# Patient Record
Sex: FEMALE | Race: WHITE | NOT HISPANIC OR LATINO | Employment: UNEMPLOYED | URBAN - METROPOLITAN AREA
[De-identification: names, ages, dates, MRNs, and addresses within clinical notes are randomized per-mention and may not be internally consistent; named-entity substitution may affect disease eponyms.]

---

## 2021-07-14 ENCOUNTER — OFFICE VISIT (OUTPATIENT)
Dept: FAMILY MEDICINE CLINIC | Facility: CLINIC | Age: 66
End: 2021-07-14

## 2021-07-14 VITALS
OXYGEN SATURATION: 98 % | SYSTOLIC BLOOD PRESSURE: 138 MMHG | WEIGHT: 189 LBS | DIASTOLIC BLOOD PRESSURE: 80 MMHG | HEART RATE: 57 BPM | HEIGHT: 63 IN | BODY MASS INDEX: 33.49 KG/M2

## 2021-07-14 DIAGNOSIS — E53.8 VITAMIN B12 DEFICIENCY: ICD-10-CM

## 2021-07-14 DIAGNOSIS — Z00.00 PREVENTATIVE HEALTH CARE: ICD-10-CM

## 2021-07-14 DIAGNOSIS — E78.2 MIXED HYPERLIPIDEMIA: Primary | ICD-10-CM

## 2021-07-14 DIAGNOSIS — Z12.31 ENCOUNTER FOR SCREENING MAMMOGRAM FOR BREAST CANCER: ICD-10-CM

## 2021-07-14 DIAGNOSIS — E03.9 ACQUIRED HYPOTHYROIDISM: ICD-10-CM

## 2021-07-14 DIAGNOSIS — E55.9 VITAMIN D DEFICIENCY: ICD-10-CM

## 2021-07-14 PROCEDURE — 82607 VITAMIN B-12: CPT | Performed by: NURSE PRACTITIONER

## 2021-07-14 PROCEDURE — 36415 COLL VENOUS BLD VENIPUNCTURE: CPT | Performed by: NURSE PRACTITIONER

## 2021-07-14 PROCEDURE — 80053 COMPREHEN METABOLIC PANEL: CPT | Performed by: NURSE PRACTITIONER

## 2021-07-14 PROCEDURE — 86803 HEPATITIS C AB TEST: CPT | Performed by: NURSE PRACTITIONER

## 2021-07-14 PROCEDURE — 99204 OFFICE O/P NEW MOD 45 MIN: CPT | Performed by: NURSE PRACTITIONER

## 2021-07-14 PROCEDURE — 82306 VITAMIN D 25 HYDROXY: CPT | Performed by: NURSE PRACTITIONER

## 2021-07-14 PROCEDURE — 85027 COMPLETE CBC AUTOMATED: CPT | Performed by: NURSE PRACTITIONER

## 2021-07-14 PROCEDURE — 80061 LIPID PANEL: CPT | Performed by: NURSE PRACTITIONER

## 2021-07-14 PROCEDURE — 84443 ASSAY THYROID STIM HORMONE: CPT | Performed by: NURSE PRACTITIONER

## 2021-07-14 RX ORDER — LEVOTHYROXINE SODIUM 0.07 MG/1
75 TABLET ORAL DAILY
COMMUNITY
End: 2021-07-14 | Stop reason: SDUPTHER

## 2021-07-14 RX ORDER — LEVOTHYROXINE SODIUM 0.07 MG/1
75 TABLET ORAL DAILY
Qty: 30 TABLET | Refills: 2 | Status: SHIPPED | OUTPATIENT
Start: 2021-07-14 | End: 2021-10-20

## 2021-07-14 RX ORDER — DIPHENOXYLATE HYDROCHLORIDE AND ATROPINE SULFATE 2.5; .025 MG/1; MG/1
TABLET ORAL DAILY
COMMUNITY

## 2021-07-14 RX ORDER — SIMVASTATIN 10 MG
10 TABLET ORAL NIGHTLY
Qty: 30 TABLET | Refills: 2 | Status: SHIPPED | OUTPATIENT
Start: 2021-07-14 | End: 2021-10-20

## 2021-07-14 RX ORDER — SIMVASTATIN 10 MG
10 TABLET ORAL NIGHTLY
COMMUNITY
End: 2021-07-14 | Stop reason: SDUPTHER

## 2021-07-14 NOTE — PROGRESS NOTES
Chief Complaint  Establish Care (new to the area and it has been a while since she has seen a doctor. Needs refills on some medications. )    Subjective          Venita Mcdowell presents to [facility]  History of Present Illness    Patient here to establish care.    Patient has history of fatigue and takes Vitamin B12 daily. She also takes Vitamin D due to deficiency.    Patient has history of hypothyroidism. She takes Synthroid 75mcg daily. Patient has not had TSH checked in at least six months.     Patient also has hyperlipidemia. She is taking Zocor as prescribed with no complications. Patient reports previous PCP was going to discontinue Zocor previously due to improving levels.     Patient is due for Mammogram. She had Colonscopy in 2019, due to repeat in 5 years.     Patient has chronic constipation, taking Colace routinely. She reports medication keeps her regular. Patient denies blood/mucus in stool.     Patient denies headache, dizziness, syncope, chest pain.  She has occasional mild swelling to bilateral ankles.  Patient denies cough, dyspnea.  She has no acute bowel or bladder complaints.    PHQ-9 Depression Screening  Little interest or pleasure in doing things? 0   Feeling down, depressed, or hopeless? 0   Trouble falling or staying asleep, or sleeping too much? 0   Feeling tired or having little energy? 0   Poor appetite or overeating? 0   Feeling bad about yourself - or that you are a failure or have let yourself or your family down? 0   Trouble concentrating on things, such as reading the newspaper or watching television? 0   Moving or speaking so slowly that other people could have noticed? Or the opposite - being so fidgety or restless that you have been moving around a lot more than usual? 0   Thoughts that you would be better off dead, or of hurting yourself in some way? 0   PHQ-9 Total Score 0   If you checked off any problems, how difficult have these problems made it  "for you to do your work, take care of things at home, or get along with other people? Not difficult at all         Objective   Vital Signs:   /80 (BP Location: Left arm, Patient Position: Sitting, Cuff Size: Adult)   Pulse 57   Ht 158.8 cm (62.5\")   Wt 85.7 kg (189 lb)   SpO2 98%   BMI 34.02 kg/m²       Physical Exam  Constitutional:       Appearance: Normal appearance.   HENT:      Head: Normocephalic.   Cardiovascular:      Rate and Rhythm: Normal rate and regular rhythm.   Pulmonary:      Effort: Pulmonary effort is normal.      Breath sounds: Normal breath sounds.   Abdominal:      General: Abdomen is flat. Bowel sounds are normal.      Palpations: Abdomen is soft.   Musculoskeletal:         General: Normal range of motion.      Cervical back: Neck supple.      Right lower leg: No edema.      Left lower leg: No edema.   Skin:     General: Skin is warm and dry.   Neurological:      Mental Status: She is alert and oriented to person, place, and time.      Gait: Gait is intact.   Psychiatric:         Attention and Perception: Attention normal.         Mood and Affect: Mood normal.         Speech: Speech normal.        Result Review :                 Assessment and Plan    Diagnoses and all orders for this visit:    1. Mixed hyperlipidemia (Primary)  Assessment & Plan:  1.  Continue Zocor as prescribed  2.  Check a lipid panel today      Orders:  -     CBC (No Diff)  -     Comprehensive Metabolic Panel  -     Lipid Panel  -     TSH    2. Encounter for screening mammogram for breast cancer  Assessment & Plan:  1.  Order mammogram    Orders:  -     Mammo Screening Digital Tomosynthesis Bilateral With CAD; Future    3. Preventative health care  Assessment & Plan:  1.  Colonoscopy up-to-date, will request records  2.  Update mammogram      Orders:  -     Hepatitis C Antibody    4. Vitamin B12 deficiency  Assessment & Plan:  1.  Continue vitamin B12  2.  Check vitamin B12 levels    Orders:  -     Vitamin " B12    5. Vitamin D deficiency  Assessment & Plan:  1.  Continue vitamin D and check level today    Orders:  -     Vitamin D 25 Hydroxy    6. Acquired hypothyroidism  Assessment & Plan:  1.  Continue Synthroid as prescribed  2.  Check TSH      Other orders  -     levothyroxine (SYNTHROID, LEVOTHROID) 75 MCG tablet; Take 1 tablet by mouth Daily.  Dispense: 30 tablet; Refill: 2  -     simvastatin (ZOCOR) 10 MG tablet; Take 1 tablet by mouth Every Night.  Dispense: 30 tablet; Refill: 2    I spent 30 minutes caring for Venita on this date of service. This time includes time spent by me in the following activities:preparing for the visit, obtaining and/or reviewing a separately obtained history, performing a medically appropriate examination and/or evaluation , counseling and educating the patient/family/caregiver, ordering medications, tests, or procedures, documenting information in the medical record and care coordination  Follow Up   Return in about 6 months (around 1/14/2022) for Hyperlipidemia/Hypothyroidism.  Patient was given instructions and counseling regarding her condition or for health maintenance advice. Please see specific information pulled into the AVS if appropriate.

## 2021-07-15 LAB
25(OH)D3 SERPL-MCNC: 41.9 NG/ML (ref 30–100)
ALBUMIN SERPL-MCNC: 4.2 G/DL (ref 3.5–5.2)
ALBUMIN/GLOB SERPL: 1.7 G/DL
ALP SERPL-CCNC: 89 U/L (ref 39–117)
ALT SERPL W P-5'-P-CCNC: 31 U/L (ref 1–33)
ANION GAP SERPL CALCULATED.3IONS-SCNC: 9.8 MMOL/L (ref 5–15)
AST SERPL-CCNC: 29 U/L (ref 1–32)
BILIRUB SERPL-MCNC: 0.3 MG/DL (ref 0–1.2)
BUN SERPL-MCNC: 15 MG/DL (ref 8–23)
BUN/CREAT SERPL: 19 (ref 7–25)
CALCIUM SPEC-SCNC: 9.1 MG/DL (ref 8.6–10.5)
CHLORIDE SERPL-SCNC: 102 MMOL/L (ref 98–107)
CHOLEST SERPL-MCNC: 207 MG/DL (ref 0–200)
CO2 SERPL-SCNC: 26.2 MMOL/L (ref 22–29)
CREAT SERPL-MCNC: 0.79 MG/DL (ref 0.57–1)
DEPRECATED RDW RBC AUTO: 40.5 FL (ref 37–54)
ERYTHROCYTE [DISTWIDTH] IN BLOOD BY AUTOMATED COUNT: 12.5 % (ref 12.3–15.4)
GFR SERPL CREATININE-BSD FRML MDRD: 74 ML/MIN/1.73
GLOBULIN UR ELPH-MCNC: 2.5 GM/DL
GLUCOSE SERPL-MCNC: 95 MG/DL (ref 65–99)
HCT VFR BLD AUTO: 41 % (ref 34–46.6)
HCV AB SER DONR QL: NORMAL
HDLC SERPL-MCNC: 45 MG/DL (ref 40–60)
HGB BLD-MCNC: 13.7 G/DL (ref 12–15.9)
LDLC SERPL CALC-MCNC: 123 MG/DL (ref 0–100)
LDLC/HDLC SERPL: 2.62 {RATIO}
MCH RBC QN AUTO: 29.8 PG (ref 26.6–33)
MCHC RBC AUTO-ENTMCNC: 33.4 G/DL (ref 31.5–35.7)
MCV RBC AUTO: 89.1 FL (ref 79–97)
PLATELET # BLD AUTO: 330 10*3/MM3 (ref 140–450)
PMV BLD AUTO: 10.9 FL (ref 6–12)
POTASSIUM SERPL-SCNC: 4.7 MMOL/L (ref 3.5–5.2)
PROT SERPL-MCNC: 6.7 G/DL (ref 6–8.5)
RBC # BLD AUTO: 4.6 10*6/MM3 (ref 3.77–5.28)
SODIUM SERPL-SCNC: 138 MMOL/L (ref 136–145)
TRIGL SERPL-MCNC: 220 MG/DL (ref 0–150)
TSH SERPL DL<=0.05 MIU/L-ACNC: 1.27 UIU/ML (ref 0.27–4.2)
VIT B12 BLD-MCNC: 1267 PG/ML (ref 211–946)
VLDLC SERPL-MCNC: 39 MG/DL (ref 5–40)
WBC # BLD AUTO: 6.13 10*3/MM3 (ref 3.4–10.8)

## 2021-07-15 NOTE — PROGRESS NOTES
Labs look good. Her total cholesterol is 207 with triglycerides at 220. I recommend continuing her Zocor nightly. Rest of labs are normal    The 10-year ASCVD risk score (Clara FLOOD Jr., et al., 2013) is: 5.9%    Values used to calculate the score:      Age: 65 years      Sex: Female      Is Non- : No      Diabetic: No      Tobacco smoker: No      Systolic Blood Pressure: 138 mmHg      Is BP treated: No      HDL Cholesterol: 45 mg/dL      Total Cholesterol: 207 mg/dL

## 2021-08-02 ENCOUNTER — HOSPITAL ENCOUNTER (OUTPATIENT)
Dept: MAMMOGRAPHY | Facility: HOSPITAL | Age: 66
Discharge: HOME | End: 2021-08-02
Admitting: NURSE PRACTITIONER

## 2021-08-02 DIAGNOSIS — Z12.31 ENCOUNTER FOR SCREENING MAMMOGRAM FOR BREAST CANCER: ICD-10-CM

## 2021-08-02 PROCEDURE — 77063 BREAST TOMOSYNTHESIS BI: CPT

## 2021-08-02 PROCEDURE — 77067 SCR MAMMO BI INCL CAD: CPT

## 2021-09-21 ENCOUNTER — OFFICE VISIT (OUTPATIENT)
Dept: FAMILY MEDICINE CLINIC | Facility: CLINIC | Age: 66
End: 2021-09-21

## 2021-09-21 VITALS
BODY MASS INDEX: 32.07 KG/M2 | HEIGHT: 63 IN | WEIGHT: 181 LBS | SYSTOLIC BLOOD PRESSURE: 128 MMHG | OXYGEN SATURATION: 99 % | HEART RATE: 63 BPM | DIASTOLIC BLOOD PRESSURE: 80 MMHG

## 2021-09-21 DIAGNOSIS — H61.23 BILATERAL IMPACTED CERUMEN: ICD-10-CM

## 2021-09-21 DIAGNOSIS — M54.9 UPPER BACK PAIN: ICD-10-CM

## 2021-09-21 DIAGNOSIS — R06.00 DYSPNEA, UNSPECIFIED TYPE: ICD-10-CM

## 2021-09-21 DIAGNOSIS — R53.83 FATIGUE, UNSPECIFIED TYPE: Primary | ICD-10-CM

## 2021-09-21 PROCEDURE — 99213 OFFICE O/P EST LOW 20 MIN: CPT | Performed by: NURSE PRACTITIONER

## 2021-09-21 PROCEDURE — 93000 ELECTROCARDIOGRAM COMPLETE: CPT | Performed by: NURSE PRACTITIONER

## 2021-09-21 PROCEDURE — 69209 REMOVE IMPACTED EAR WAX UNI: CPT | Performed by: NURSE PRACTITIONER

## 2021-09-21 NOTE — ASSESSMENT & PLAN NOTE
1.  EKG shows sinus bradycardia  2.  Refer to cardiology to ensure pain is not cardiac in origin  3.  I do want patient to try improving her posture, using a heating pad and taking ibuprofen as needed  4.  If symptoms persist or worsen, she is to go to ER

## 2021-09-21 NOTE — PROGRESS NOTES
"Chief Complaint  Shoulder Pain (pain on the right shoulder and across the back between the shoulders ), Back Pain (middle of back ), and Ear Fullness (left ear feels plugged up )    Subjective          Venita Mcdowell presents to [facility]  History of Present Illness    Patient presents with intermittent c/o right sided upper back pain that radiates across her upper back. She reports pain accompanied with fatigue. No aggravating factors. Episodes come sporadically. Patient has taken Ibuprofen with some relief. She reports pain will radiate into neck and occasional right arm, occasionally causing a little breathlessness. Symptoms present for approximately a month. Patient not having pain at time of visit.  She denies chest pain, dizziness or syncope.  Patient denies any recent injury or change in daily activities.    Patient also c/o fullness to bilateral ears. She denies pain, discharge, fever, or hearing loss. No aggravating factors.     Objective   Vital Signs:   /80 (BP Location: Left arm, Patient Position: Sitting, Cuff Size: Adult)   Pulse 63   Ht 158.8 cm (62.5\")   Wt 82.1 kg (181 lb)   SpO2 99%   BMI 32.58 kg/m²       Physical Exam  Constitutional:       Appearance: Normal appearance.   HENT:      Head: Normocephalic.      Right Ear: There is impacted cerumen.      Left Ear: There is impacted cerumen.   Cardiovascular:      Rate and Rhythm: Normal rate and regular rhythm.   Pulmonary:      Effort: Pulmonary effort is normal.      Breath sounds: Normal breath sounds.   Abdominal:      General: Abdomen is flat. Bowel sounds are normal.      Palpations: Abdomen is soft.   Musculoskeletal:         General: Normal range of motion.      Right shoulder: No swelling or tenderness.      Left shoulder: No swelling or tenderness.      Cervical back: Neck supple. No swelling or tenderness. Normal range of motion.      Lumbar back: No tenderness. Normal range of motion.      Right " lower leg: No edema.      Left lower leg: No edema.   Skin:     General: Skin is warm and dry.   Neurological:      Mental Status: She is alert and oriented to person, place, and time.      Gait: Gait is intact.   Psychiatric:         Attention and Perception: Attention normal.         Mood and Affect: Mood normal.         Speech: Speech normal.        Result Review :                 Assessment and Plan    Diagnoses and all orders for this visit:    1. Fatigue, unspecified type (Primary)  -     Ambulatory Referral to Cardiology    2. Dyspnea, unspecified type  -     Ambulatory Referral to Cardiology  -     ECG 12 Lead    3. Upper back pain  Assessment & Plan:  1.  EKG shows sinus bradycardia  2.  Refer to cardiology to ensure pain is not cardiac in origin  3.  I do want patient to try improving her posture, using a heating pad and taking ibuprofen as needed  4.  If symptoms persist or worsen, she is to go to ER    Orders:  -     Ambulatory Referral to Cardiology  -     ECG 12 Lead    4. Bilateral impacted cerumen  Assessment & Plan:  1.  Ordered bilateral ear irrigation      I spent 25 minutes caring for Venita on this date of service. This time includes time spent by me in the following activities:preparing for the visit, reviewing tests, obtaining and/or reviewing a separately obtained history, performing a medically appropriate examination and/or evaluation , counseling and educating the patient/family/caregiver, ordering medications, tests, or procedures, referring and communicating with other health care professionals , documenting information in the medical record and independently interpreting results and communicating that information with the patient/family/caregiver  Follow Up   Return if symptoms worsen or fail to improve.  Patient was given instructions and counseling regarding her condition or for health maintenance advice. Please see specific information pulled into the AVS if appropriate.       Answers  for HPI/ROS submitted by the patient on 9/20/2021  What is the primary reason for your visit?: Back Pain

## 2021-09-24 ENCOUNTER — OFFICE VISIT (OUTPATIENT)
Dept: CARDIOLOGY | Facility: CLINIC | Age: 66
End: 2021-09-24

## 2021-09-24 VITALS
WEIGHT: 180 LBS | HEART RATE: 73 BPM | SYSTOLIC BLOOD PRESSURE: 134 MMHG | HEIGHT: 62 IN | DIASTOLIC BLOOD PRESSURE: 86 MMHG | BODY MASS INDEX: 33.13 KG/M2

## 2021-09-24 DIAGNOSIS — I20.8 ANGINAL EQUIVALENT (HCC): ICD-10-CM

## 2021-09-24 DIAGNOSIS — R53.83 FATIGUE, UNSPECIFIED TYPE: ICD-10-CM

## 2021-09-24 DIAGNOSIS — R00.1 BRADYCARDIA, SINUS: ICD-10-CM

## 2021-09-24 DIAGNOSIS — R06.00 DYSPNEA, UNSPECIFIED TYPE: Primary | ICD-10-CM

## 2021-09-24 DIAGNOSIS — E78.2 MIXED HYPERLIPIDEMIA: ICD-10-CM

## 2021-09-24 PROCEDURE — 99205 OFFICE O/P NEW HI 60 MIN: CPT | Performed by: INTERNAL MEDICINE

## 2021-09-24 NOTE — PROGRESS NOTES
Chief Complaint  Slow Heart Rate    Subjective    History of Present Illness      I saw Venita Mcdowell today for cardiovascular evaluation.  She is a pleasant 65-year-old female with no prior known cardiac disease history.  She was found to have a sinus bradycardia on EKG 9/21/2021 and has a 2-month history of upper back and mid back discomfort.  She reports increased generalized fatigue as well.  The upper back discomfort is described as a dull ache which can occur at rest or with exertion and can last from 30 minutes up to 2 hours.  She does not feel that there is any associated shortness of breath diaphoresis or nausea.  The symptoms can occur on a daily basis.  She is tried ibuprofen without any significant improvement.  She is unaware of any palliative or precipitating cause.  In addition she has had some epigastric discomfort that has radiated to the back.  This is described as a stabbing or sharp discomfort lasting 2 minutes or so in duration.  Again no associated shortness of breath diaphoresis or nausea.  This may occur 2 times per week.  She does not report any distinct predictable exertional chest pain pressure or tightness.  She is free of any recent injury or fall.  She does report relocating back from [facility] to the [facility] with some heavy lifting and boxing of items but this was completed January of this year.  She sleeps with 1 pillow and a 10 degree elevation of the head of the bed free of orthopnea or PND.  She denies lower extremity edema.  She is free of syncope near syncope or palpitations.    Review of EKG by me from 9/21/2021 reveals sinus bradycardia at 56 bpm otherwise normal EKG    Cardiac risk factors positive for hyperlipidemia negative for hypertension diabetes tobacco use or family history of premature heart disease.    Past medical history: Hypothyroidism    Family history: Negative for premature coronary heart disease    Social history: No tobacco or alcohol  "use    Objective   Vital Signs:   /86   Pulse 73   Ht 157.5 cm (62\")   Wt 81.6 kg (180 lb)   BMI 32.92 kg/m²     Constitutional:       Appearance: Well-developed. Obese.   Eyes:      Conjunctiva/sclera: Conjunctivae normal.      Pupils: Pupils are equal, round, and reactive to light.   HENT:      Head: Normocephalic and atraumatic.   Neck:      Thyroid: No thyromegaly.   Pulmonary:      Effort: Pulmonary effort is normal. No respiratory distress.      Breath sounds: Normal breath sounds. No wheezing. No rales.   Cardiovascular:      Normal rate. Regular rhythm.      S4 Gallop. No S3 gallop. No rub.   Edema:     Peripheral edema absent.   Abdominal:      General: Bowel sounds are normal. There is no distension.      Palpations: Abdomen is soft. There is no abdominal mass.      Tenderness: There is no abdominal tenderness.   Musculoskeletal: Normal range of motion.      Cervical back: Neck supple. Skin:     General: Skin is warm and dry.      Findings: No erythema.   Neurological:      Mental Status: Alert and oriented to person, place, and time.         Result Review :     Common labs    Common Labsle 7/14/21 7/14/21 7/14/21    1506 1506 1506   Glucose 95     BUN 15     Creatinine 0.79     eGFR Non African Am 74     Sodium 138     Potassium 4.7     Chloride 102     Calcium 9.1     Albumin 4.20     Total Bilirubin 0.3     Alkaline Phosphatase 89     AST (SGOT) 29     ALT (SGPT) 31     WBC   6.13   Hemoglobin   13.7   Hematocrit   41.0   Platelets   330   Total Cholesterol  207 (A)    Triglycerides  220 (A)    HDL Cholesterol  45    LDL Cholesterol   123 (A)    (A) Abnormal value                      Assessment and Plan    1. Dyspnea, unspecified type  Stable but persistent  - Stress Test With Myocardial Perfusion One Day; Future    2. Fatigue, unspecified type  Persistent  - Stress Test With Myocardial Perfusion One Day; Future    3. Mixed hyperlipidemia  Low-cholesterol low saturated fat diet and continue " simvastatin 10 mg daily  - Lipid Panel; Future    4. Bradycardia, sinus  Asymptomatic    5. Anginal equivalent (HCC)  Upper mid back discomfort    We will proceed with stress Cardiolite noninvasive ischemic assessment.  I will also obtain a fasting lipid profile liver function tests.  I have encouraged Venita to observe a low-cholesterol low saturated fat weight reduction diet.  I will give her a trial of omeprazole 20 mg daily for 4 weeks.  I will plan to see him in follow-up in 6 weeks sooner if needed      I spent 60 minutes caring for Venita on this date of service. This time includes time spent by me in the following activities:preparing for the visit, performing a medically appropriate examination and/or evaluation , counseling and educating the patient/family/caregiver, ordering medications, tests, or procedures, referring and communicating with other health care professionals , documenting information in the medical record and care coordination  Follow Up   No follow-ups on file.  Patient was given instructions and counseling regarding her condition or for health maintenance advice. Please see specific information pulled into the AVS if appropriate.

## 2021-10-20 ENCOUNTER — TELEPHONE (OUTPATIENT)
Dept: FAMILY MEDICINE CLINIC | Facility: CLINIC | Age: 66
End: 2021-10-20

## 2021-10-20 RX ORDER — SIMVASTATIN 10 MG
10 TABLET ORAL NIGHTLY
Qty: 90 TABLET | Refills: 0 | Status: CANCELLED | OUTPATIENT
Start: 2021-10-20

## 2021-10-20 RX ORDER — LEVOTHYROXINE SODIUM 75 UG/1
TABLET ORAL
Qty: 90 TABLET | Refills: 0 | Status: SHIPPED | OUTPATIENT
Start: 2021-10-20 | End: 2021-10-25 | Stop reason: SDUPTHER

## 2021-10-20 RX ORDER — SIMVASTATIN 10 MG
TABLET ORAL
Qty: 90 TABLET | Refills: 0 | Status: SHIPPED | OUTPATIENT
Start: 2021-10-20 | End: 2021-10-25 | Stop reason: SDUPTHER

## 2021-10-20 RX ORDER — LEVOTHYROXINE SODIUM 0.07 MG/1
75 TABLET ORAL DAILY
Qty: 90 TABLET | Refills: 0 | Status: CANCELLED | OUTPATIENT
Start: 2021-10-20

## 2021-10-20 NOTE — TELEPHONE ENCOUNTER
Caller: Venita Mcdowell    Relationship: Self      Medication requested (name and dosage):     Requested Prescriptions:   Requested Prescriptions     Pending Prescriptions Disp Refills   • simvastatin (ZOCOR) 10 MG tablet 90 tablet 0     Sig: Take 1 tablet by mouth Every Night.   • levothyroxine (Euthyrox) 75 MCG tablet 90 tablet 0     Sig: Take 1 tablet by mouth Daily.        Pharmacy where request should be sent:   [address] - [phone] [place] [id number] [phone]    Additional details provided by patient: ONLY HAS 1 PILL LEFT    Best call back number: [phone]    Does the patient have less than a 3 day supply:  [x] Yes  [] No    Robby Barlow Rep   10/20/21 11:39 EDT

## 2021-10-20 NOTE — TELEPHONE ENCOUNTER
Called and let patient know refills were sent to the pharmacy earlier today for both medications.

## 2021-10-25 RX ORDER — SIMVASTATIN 10 MG
10 TABLET ORAL NIGHTLY
Qty: 90 TABLET | Refills: 0 | Status: SHIPPED | OUTPATIENT
Start: 2021-10-25 | End: 2021-12-07 | Stop reason: SDUPTHER

## 2021-10-25 RX ORDER — LEVOTHYROXINE SODIUM 0.07 MG/1
75 TABLET ORAL DAILY
Qty: 90 TABLET | Refills: 0 | Status: SHIPPED | OUTPATIENT
Start: 2021-10-25 | End: 2021-12-07 | Stop reason: SDUPTHER

## 2021-12-07 ENCOUNTER — OFFICE VISIT (OUTPATIENT)
Dept: FAMILY MEDICINE CLINIC | Facility: CLINIC | Age: 66
End: 2021-12-07

## 2021-12-07 VITALS
WEIGHT: 182 LBS | DIASTOLIC BLOOD PRESSURE: 80 MMHG | SYSTOLIC BLOOD PRESSURE: 128 MMHG | HEIGHT: 62 IN | OXYGEN SATURATION: 98 % | BODY MASS INDEX: 33.49 KG/M2 | HEART RATE: 61 BPM

## 2021-12-07 DIAGNOSIS — R53.83 FATIGUE, UNSPECIFIED TYPE: ICD-10-CM

## 2021-12-07 DIAGNOSIS — E53.8 VITAMIN B12 DEFICIENCY: Primary | ICD-10-CM

## 2021-12-07 DIAGNOSIS — E78.2 MIXED HYPERLIPIDEMIA: ICD-10-CM

## 2021-12-07 DIAGNOSIS — G89.29 CHRONIC MIDLINE THORACIC BACK PAIN: ICD-10-CM

## 2021-12-07 DIAGNOSIS — M54.6 CHRONIC MIDLINE THORACIC BACK PAIN: ICD-10-CM

## 2021-12-07 DIAGNOSIS — Z00.00 PREVENTATIVE HEALTH CARE: ICD-10-CM

## 2021-12-07 DIAGNOSIS — E03.9 ACQUIRED HYPOTHYROIDISM: ICD-10-CM

## 2021-12-07 PROCEDURE — 90471 IMMUNIZATION ADMIN: CPT | Performed by: NURSE PRACTITIONER

## 2021-12-07 PROCEDURE — 99214 OFFICE O/P EST MOD 30 MIN: CPT | Performed by: NURSE PRACTITIONER

## 2021-12-07 PROCEDURE — 90686 IIV4 VACC NO PRSV 0.5 ML IM: CPT | Performed by: NURSE PRACTITIONER

## 2021-12-07 RX ORDER — LEVOTHYROXINE SODIUM 0.07 MG/1
75 TABLET ORAL DAILY
Qty: 90 TABLET | Refills: 0 | Status: SHIPPED | OUTPATIENT
Start: 2021-12-07 | End: 2022-04-11 | Stop reason: SDUPTHER

## 2021-12-07 RX ORDER — SIMVASTATIN 10 MG
10 TABLET ORAL NIGHTLY
Qty: 90 TABLET | Refills: 0 | Status: SHIPPED | OUTPATIENT
Start: 2021-12-07 | End: 2021-12-30

## 2021-12-07 NOTE — PROGRESS NOTES
"Chief Complaint  Follow-up (6 month follow up hyperlipidemia)    Subjective          Venita Mcdowell presents to [facility]  History of Present Illness    Patient here for f/u visit.     Patient is taking Zocor 10mg nightly for hyperlipidemia.  She has no acute complaints.    Patient has history of hypothyroidism, taking levothyroxine 75 mcg daily.  She has no acute complaints.    Patient previously complained of increased fatigue and upper back pain.  She was referred to cardiology for further evaluation.  Stress test was ordered but patient is unable to afford.  Patient reports since previous visit, upper back pain has improved as has fatigue.  She denies dizziness, headache, chest pain, swelling, cough or dyspnea.  Patient does complain of a mid back pain that is intermittent, sharp in nature.  Pain lasts for up to 30 minutes and then resolves without intervention.  No known aggravating or alleviating factors.    Objective   Vital Signs:   /80 (BP Location: Left arm, Patient Position: Sitting, Cuff Size: Adult)   Pulse 61   Ht 157.5 cm (62\")   Wt 82.6 kg (182 lb)   SpO2 98%   BMI 33.29 kg/m²       Physical Exam  Constitutional:       Appearance: Normal appearance.   HENT:      Head: Normocephalic.   Cardiovascular:      Rate and Rhythm: Normal rate and regular rhythm.   Pulmonary:      Effort: Pulmonary effort is normal.      Breath sounds: Normal breath sounds.   Abdominal:      General: Abdomen is flat. Bowel sounds are normal.      Palpations: Abdomen is soft.   Musculoskeletal:         General: Normal range of motion.      Cervical back: Neck supple.        Back:       Right lower leg: No edema.      Left lower leg: No edema.   Skin:     General: Skin is warm and dry.   Neurological:      Mental Status: She is alert and oriented to person, place, and time.      Gait: Gait is intact.   Psychiatric:         Attention and Perception: Attention normal.         Mood and " Affect: Mood normal.         Speech: Speech normal.        Result Review :     CMP    CMP 7/14/21   Glucose 95   BUN 15   Creatinine 0.79   eGFR Non African Am 74   Sodium 138   Potassium 4.7   Chloride 102   Calcium 9.1   Albumin 4.20   Total Bilirubin 0.3   Alkaline Phosphatase 89   AST (SGOT) 29   ALT (SGPT) 31           CBC    CBC 7/14/21   WBC 6.13   RBC 4.60   Hemoglobin 13.7   Hematocrit 41.0   MCV 89.1   MCH 29.8   MCHC 33.4   RDW 12.5   Platelets 330           Lipid Panel    Lipid Panel 7/14/21   Total Cholesterol 207 (A)   Triglycerides 220 (A)   HDL Cholesterol 45   VLDL Cholesterol 39   LDL Cholesterol  123 (A)   LDL/HDL Ratio 2.62   (A) Abnormal value            TSH    TSH 7/14/21   TSH 1.270           Data reviewed: Consultant notes Cardiology          Assessment and Plan    Diagnoses and all orders for this visit:    1. Vitamin B12 deficiency (Primary)  -     Vitamin B12    2. Mixed hyperlipidemia  Assessment & Plan:  1.  Continue Zocor  2.  Labs ordered today, patient wants to wait until January 1 to have drawn    Orders:  -     CBC (No Diff)  -     Basic Metabolic Panel  -     Lipid Panel    3. Preventative health care    4. Acquired hypothyroidism  Assessment & Plan:  1.  Continue Synthroid  2.  Labs ordered    Orders:  -     TSH    5. Fatigue, unspecified type  Assessment & Plan:  1.  Improving  2.  Follow-up with cardiology as needed      6. Chronic midline thoracic back pain  Assessment & Plan:  1.  Encouraged heat, massage and stretching  2.  Call with any new or worsening concerns      Other orders  -     FluLaval/Fluarix/Fluzone >6 Months ([id number])  -     simvastatin (ZOCOR) 10 MG tablet; Take 1 tablet by mouth Every Night.  Dispense: 90 tablet; Refill: 0  -     levothyroxine (Euthyrox) 75 MCG tablet; Take 1 tablet by mouth Daily.  Dispense: 90 tablet; Refill: 0    I spent 25 minutes caring for Venita on this date of service. This time includes time spent by me in the following  activities:preparing for the visit, reviewing tests, obtaining and/or reviewing a separately obtained history, performing a medically appropriate examination and/or evaluation , counseling and educating the patient/family/caregiver, ordering medications, tests, or procedures, documenting information in the medical record and care coordination  Follow Up   Return in about 6 months (around 6/7/2022).  Patient was given instructions and counseling regarding her condition or for health maintenance advice. Please see specific information pulled into the AVS if appropriate.

## 2021-12-13 ENCOUNTER — TELEPHONE (OUTPATIENT)
Dept: FAMILY MEDICINE CLINIC | Facility: CLINIC | Age: 66
End: 2021-12-13

## 2021-12-13 DIAGNOSIS — H53.9 VISION CHANGES: Primary | ICD-10-CM

## 2021-12-13 DIAGNOSIS — H53.19 PHOTOPSIA: ICD-10-CM

## 2021-12-13 NOTE — TELEPHONE ENCOUNTER
Pt calling stated that she is having issues with her eyes that started last night. She stated that in her right eye it is like she has flashes of light down the edge of the eye like a lightening bolt. She said that eye only feels like it almost has a film over it. She is not sure if she needs a referral or what you can recommend. She did call the [facility] [place] and they recommended she contact her PCP first to see what they advise.

## 2021-12-13 NOTE — TELEPHONE ENCOUNTER
Left message for patient to call the office if she is still needing something and advised that she ask to speak with me.

## 2021-12-13 NOTE — TELEPHONE ENCOUNTER
I am going to send a referral over to [facility].  She needs to be evaluated by ophthalmology.  Please provide her with the phone number so that she can call and schedule an appointment today

## 2021-12-13 NOTE — TELEPHONE ENCOUNTER
Caller: Venita Mcdowell    Relationship: Self    Best call back number:    What is the best time to reach you: ANYTIME    Who are you requesting to speak with (clinical staff, provider,  specific staff member):     Do you know the name of the person who called:     What was the call regarding: PATIENT IS REQUESTING A CALL BACK FROM [facility]    Do you require a callback: YES

## 2021-12-29 ENCOUNTER — CLINICAL SUPPORT (OUTPATIENT)
Dept: FAMILY MEDICINE CLINIC | Facility: CLINIC | Age: 66
End: 2021-12-29

## 2021-12-29 DIAGNOSIS — E03.9 ACQUIRED HYPOTHYROIDISM: ICD-10-CM

## 2021-12-29 PROCEDURE — 85027 COMPLETE CBC AUTOMATED: CPT | Performed by: NURSE PRACTITIONER

## 2021-12-29 PROCEDURE — 84443 ASSAY THYROID STIM HORMONE: CPT | Performed by: NURSE PRACTITIONER

## 2021-12-29 PROCEDURE — 82607 VITAMIN B-12: CPT | Performed by: NURSE PRACTITIONER

## 2021-12-29 PROCEDURE — 36415 COLL VENOUS BLD VENIPUNCTURE: CPT | Performed by: NURSE PRACTITIONER

## 2021-12-29 PROCEDURE — 80061 LIPID PANEL: CPT | Performed by: NURSE PRACTITIONER

## 2021-12-29 PROCEDURE — 80048 BASIC METABOLIC PNL TOTAL CA: CPT | Performed by: NURSE PRACTITIONER

## 2021-12-30 LAB
ANION GAP SERPL CALCULATED.3IONS-SCNC: 10.3 MMOL/L (ref 5–15)
BUN SERPL-MCNC: 15 MG/DL (ref 8–23)
BUN/CREAT SERPL: 17 (ref 7–25)
CALCIUM SPEC-SCNC: 9.3 MG/DL (ref 8.6–10.5)
CHLORIDE SERPL-SCNC: 108 MMOL/L (ref 98–107)
CHOLEST SERPL-MCNC: 207 MG/DL (ref 0–200)
CO2 SERPL-SCNC: 25.7 MMOL/L (ref 22–29)
CREAT SERPL-MCNC: 0.88 MG/DL (ref 0.57–1)
DEPRECATED RDW RBC AUTO: 37.9 FL (ref 37–54)
ERYTHROCYTE [DISTWIDTH] IN BLOOD BY AUTOMATED COUNT: 12.3 % (ref 12.3–15.4)
GFR SERPL CREATININE-BSD FRML MDRD: 65 ML/MIN/1.73
GLUCOSE SERPL-MCNC: 96 MG/DL (ref 65–99)
HCT VFR BLD AUTO: 38.4 % (ref 34–46.6)
HDLC SERPL-MCNC: 50 MG/DL (ref 40–60)
HGB BLD-MCNC: 13.1 G/DL (ref 12–15.9)
LDLC SERPL CALC-MCNC: 123 MG/DL (ref 0–100)
LDLC/HDLC SERPL: 2.38 {RATIO}
MCH RBC QN AUTO: 29.2 PG (ref 26.6–33)
MCHC RBC AUTO-ENTMCNC: 34.1 G/DL (ref 31.5–35.7)
MCV RBC AUTO: 85.7 FL (ref 79–97)
PLATELET # BLD AUTO: 317 10*3/MM3 (ref 140–450)
PMV BLD AUTO: 10.5 FL (ref 6–12)
POTASSIUM SERPL-SCNC: 4.5 MMOL/L (ref 3.5–5.2)
RBC # BLD AUTO: 4.48 10*6/MM3 (ref 3.77–5.28)
SODIUM SERPL-SCNC: 144 MMOL/L (ref 136–145)
TRIGL SERPL-MCNC: 191 MG/DL (ref 0–150)
TSH SERPL DL<=0.05 MIU/L-ACNC: 2.4 UIU/ML (ref 0.27–4.2)
VIT B12 BLD-MCNC: 1277 PG/ML (ref 211–946)
VLDLC SERPL-MCNC: 34 MG/DL (ref 5–40)
WBC NRBC COR # BLD: 5.52 10*3/MM3 (ref 3.4–10.8)

## 2021-12-30 RX ORDER — SIMVASTATIN 20 MG
20 TABLET ORAL NIGHTLY
Qty: 90 TABLET | Refills: 1 | Status: SHIPPED | OUTPATIENT
Start: 2021-12-30 | End: 2022-04-11 | Stop reason: SDUPTHER

## 2021-12-30 NOTE — PROGRESS NOTES
Patient's vitamin B12 is high at 1200.  This is not concerning but she certainly does not need daily supplementation.  I would recommend patient take her B12 supplement only 3-4 times weekly or change over to a vitamin B complex.  Total cholesterol is about the same.  There is an improvement noted in triglycerides.  LDL remains the same.  Her good cholesterol has increased.  I am going to increase her simvastatin to 20 mg daily.  Remainder of labs are normal.    The 10-year ASCVD risk score (Claradavid FLOOD Jr., et al., 2013) is: 5.3%    Values used to calculate the score:      Age: 66 years      Sex: Female      Is Non- : No      Diabetic: No      Tobacco smoker: No      Systolic Blood Pressure: 128 mmHg      Is BP treated: No      HDL Cholesterol: 50 mg/dL      Total Cholesterol: 207 mg/dL

## 2022-04-11 RX ORDER — LEVOTHYROXINE SODIUM 0.07 MG/1
75 TABLET ORAL DAILY
Qty: 90 TABLET | Refills: 0 | Status: SHIPPED | OUTPATIENT
Start: 2022-04-11 | End: 2022-06-08 | Stop reason: SDUPTHER

## 2022-04-11 RX ORDER — SIMVASTATIN 20 MG
20 TABLET ORAL NIGHTLY
Qty: 90 TABLET | Refills: 1 | Status: SHIPPED | OUTPATIENT
Start: 2022-04-11 | End: 2022-06-08 | Stop reason: SDUPTHER

## 2022-04-11 NOTE — TELEPHONE ENCOUNTER
Caller: July Venita G    Relationship: Self    Best call back number: [phone]    Requested Prescriptions:   Requested Prescriptions     Pending Prescriptions Disp Refills   • simvastatin (ZOCOR) 20 MG tablet 90 tablet 1     Sig: Take 1 tablet by mouth Every Night.   • levothyroxine (Euthyrox) 75 MCG tablet 90 tablet 0     Sig: Take 1 tablet by mouth Daily.        Pharmacy where request should be sent: [place] [phone] [place] [phone]      Additional details provided by patient: PATIENT IS OUT OF SIMVASTATIN AND HAS TWO DAYS LEFT OF LEVOTHYROXINE.     Does the patient have less than a 3 day supply:  [x] Yes  [] No    Robby Turner Rep   04/11/22 09:10 EDT

## 2022-05-18 ENCOUNTER — TRANSCRIBE ORDERS (OUTPATIENT)
Dept: ADMINISTRATIVE | Facility: HOSPITAL | Age: 67
End: 2022-05-18

## 2022-05-18 DIAGNOSIS — Z12.31 VISIT FOR SCREENING MAMMOGRAM: Primary | ICD-10-CM

## 2022-06-08 ENCOUNTER — TELEPHONE (OUTPATIENT)
Dept: FAMILY MEDICINE CLINIC | Facility: CLINIC | Age: 67
End: 2022-06-08

## 2022-06-08 ENCOUNTER — OFFICE VISIT (OUTPATIENT)
Dept: FAMILY MEDICINE CLINIC | Facility: CLINIC | Age: 67
End: 2022-06-08

## 2022-06-08 VITALS
HEART RATE: 66 BPM | OXYGEN SATURATION: 99 % | WEIGHT: 180 LBS | HEIGHT: 62 IN | DIASTOLIC BLOOD PRESSURE: 80 MMHG | BODY MASS INDEX: 33.13 KG/M2 | SYSTOLIC BLOOD PRESSURE: 138 MMHG

## 2022-06-08 DIAGNOSIS — E78.2 MIXED HYPERLIPIDEMIA: ICD-10-CM

## 2022-06-08 DIAGNOSIS — E03.9 ACQUIRED HYPOTHYROIDISM: ICD-10-CM

## 2022-06-08 DIAGNOSIS — Z00.00 PREVENTATIVE HEALTH CARE: ICD-10-CM

## 2022-06-08 DIAGNOSIS — M79.605 BILATERAL LEG PAIN: ICD-10-CM

## 2022-06-08 DIAGNOSIS — E55.9 VITAMIN D DEFICIENCY: ICD-10-CM

## 2022-06-08 DIAGNOSIS — M79.604 BILATERAL LEG PAIN: ICD-10-CM

## 2022-06-08 DIAGNOSIS — Z01.419 WELL WOMAN EXAM WITH ROUTINE GYNECOLOGICAL EXAM: ICD-10-CM

## 2022-06-08 DIAGNOSIS — E53.8 VITAMIN B12 DEFICIENCY: ICD-10-CM

## 2022-06-08 DIAGNOSIS — Z12.11 SCREENING FOR MALIGNANT NEOPLASM OF COLON: ICD-10-CM

## 2022-06-08 DIAGNOSIS — R10.31 RIGHT GROIN PAIN: ICD-10-CM

## 2022-06-08 DIAGNOSIS — K59.04 CHRONIC IDIOPATHIC CONSTIPATION: Primary | ICD-10-CM

## 2022-06-08 LAB
BILIRUB BLD-MCNC: NEGATIVE MG/DL
CLARITY, POC: ABNORMAL
COLOR UR: YELLOW
GLUCOSE UR STRIP-MCNC: NEGATIVE MG/DL
KETONES UR QL: NEGATIVE
LEUKOCYTE EST, POC: NEGATIVE
NITRITE UR-MCNC: NEGATIVE MG/ML
PH UR: 5 [PH] (ref 5–8)
PROT UR STRIP-MCNC: ABNORMAL MG/DL
RBC # UR STRIP: ABNORMAL /UL
SP GR UR: 1.02 (ref 1–1.03)
UROBILINOGEN UR QL: NORMAL

## 2022-06-08 PROCEDURE — 80050 GENERAL HEALTH PANEL: CPT | Performed by: NURSE PRACTITIONER

## 2022-06-08 PROCEDURE — 80061 LIPID PANEL: CPT | Performed by: NURSE PRACTITIONER

## 2022-06-08 PROCEDURE — 87086 URINE CULTURE/COLONY COUNT: CPT | Performed by: NURSE PRACTITIONER

## 2022-06-08 PROCEDURE — 81002 URINALYSIS NONAUTO W/O SCOPE: CPT | Performed by: NURSE PRACTITIONER

## 2022-06-08 PROCEDURE — 82306 VITAMIN D 25 HYDROXY: CPT | Performed by: NURSE PRACTITIONER

## 2022-06-08 PROCEDURE — 82607 VITAMIN B-12: CPT | Performed by: NURSE PRACTITIONER

## 2022-06-08 PROCEDURE — 99396 PREV VISIT EST AGE 40-64: CPT | Performed by: NURSE PRACTITIONER

## 2022-06-08 PROCEDURE — 36415 COLL VENOUS BLD VENIPUNCTURE: CPT | Performed by: NURSE PRACTITIONER

## 2022-06-08 RX ORDER — SACCHAROMYCES BOULARDII 250 MG
250 CAPSULE ORAL 2 TIMES DAILY
Qty: 60 CAPSULE | Refills: 1 | Status: SHIPPED | OUTPATIENT
Start: 2022-06-08

## 2022-06-08 RX ORDER — SIMVASTATIN 20 MG
20 TABLET ORAL NIGHTLY
Qty: 90 TABLET | Refills: 1 | Status: SHIPPED | OUTPATIENT
Start: 2022-06-08 | End: 2022-10-17 | Stop reason: SDUPTHER

## 2022-06-08 RX ORDER — LEVOTHYROXINE SODIUM 0.07 MG/1
75 TABLET ORAL DAILY
Qty: 90 TABLET | Refills: 0 | Status: SHIPPED | OUTPATIENT
Start: 2022-06-08 | End: 2022-10-17 | Stop reason: SDUPTHER

## 2022-06-08 NOTE — TELEPHONE ENCOUNTER
Spoke with patient and let her know that her spotting after a pap can be normal because we irritated the cervix. She was advised to let us know if the bleeding continues after a couple of days or if she notices an increase in bleeding. She voiced understanding.

## 2022-06-08 NOTE — TELEPHONE ENCOUNTER
Caller: Yuliet Mcdowell    Relationship: Self    Best call back number: [phone]    What is the best time to reach you: ANYTIME    Who are you requesting to speak with (clinical staff, provider,  specific staff member): GEORGE    Do you know the name of the person who called: N/A    What was the call regarding: HAD A TEST THIS AM AND WAS TOLD TO CALL IF SHE IS HAVING ANY ISSUE     Do you require a callback: YES

## 2022-06-08 NOTE — ASSESSMENT & PLAN NOTE
1.  Take Colace twice daily  2.  Increase water intake  3.  Start probiotic twice daily  4.  Trial Trulance 3 mg daily, hold for diarrhea  5.  Refer for screening colonoscopy

## 2022-06-08 NOTE — ASSESSMENT & PLAN NOTE
1.  Likely muscular  2.  Encourage stretching and massage  3.  Warm soaks  4.  Increase water intake

## 2022-06-08 NOTE — PROGRESS NOTES
"Chief Complaint  Gynecologic Exam, Constipation (Having increased issues and feels like stomach is very bloated after eating ), and Leg Pain (Aches in both legs )    Subjective        Yuliet Mcdowell presents to [facility]  History of Present Illness    Patient presents for annual exam with GYN exam.  She denies vaginal discharge, vaginal pain or vaginal bleeding.  Patient is due for mammogram in September.    Patient is c/o upper abdominal bloating after eating. She does have chronic constipation, takes Colace daily. Patient reports she has bowel movements every 4-5 days. She has tried and failed fiber, reports it causes increased bloating. Patient reports Miralax used a few years ago, was not effective. She reports Linzess caused diarrhea.  Patient denies nausea or vomiting.  She is due for repeat colonoscopy.    Patient also c/o intermittent pains to bilateral upper legs, occasionally into right groin. She describes as an ache. Patient sits all day at work and takes three flights of stairs up and down. She has some swelling to bilateral feet by the end of day, denies swelling to her upper legs, redness, warmth.     Patient is taking Zocor 20mg daily for hyperlipidemia.  Lipid panel stable in December.  Patient is taking Synthroid as prescribed for hypothyroidism.  She has no acute complaints.    Objective   Vital Signs:  /80 (BP Location: Left arm, Patient Position: Sitting, Cuff Size: Adult)   Pulse 66   Ht 157.5 cm (62\")   Wt 81.6 kg (180 lb)   SpO2 99%   BMI 32.92 kg/m²   Estimated body mass index is 32.92 kg/m² as calculated from the following:    Height as of this encounter: 157.5 cm (62\").    Weight as of this encounter: 81.6 kg (180 lb).       Physical Exam  Exam conducted with a chaperone present.   Constitutional:       Appearance: Normal appearance.   HENT:      Head: Normocephalic.   Cardiovascular:      Rate and Rhythm: Normal rate and regular rhythm. "   Pulmonary:      Effort: Pulmonary effort is normal.      Breath sounds: Normal breath sounds.   Abdominal:      General: Abdomen is flat. Bowel sounds are normal.      Palpations: Abdomen is soft.   Genitourinary:     Labia:         Right: No rash.         Left: No rash.       Vagina: Normal.      Cervix: Normal.      Adnexa: Right adnexa normal and left adnexa normal.      Rectum: Normal.   Musculoskeletal:         General: Normal range of motion.      Cervical back: Neck supple.      Right lower leg: No edema.      Left lower leg: No edema.   Skin:     General: Skin is warm and dry.   Neurological:      Mental Status: She is alert and oriented to person, place, and time.      Gait: Gait is intact.   Psychiatric:         Attention and Perception: Attention normal.         Mood and Affect: Mood normal.         Speech: Speech normal.        Result Review :{Labs  Result Review  Imaging  Med Tab  Media  Procedures  :23}    CMP    CMP 7/14/21 12/29/21   Glucose 95 96   BUN 15 15   Creatinine 0.79 0.88   eGFR Non African Am 74 65   Sodium 138 144   Potassium 4.7 4.5   Chloride 102 108 (A)   Calcium 9.1 9.3   Albumin 4.20    Total Bilirubin 0.3    Alkaline Phosphatase 89    AST (SGOT) 29    ALT (SGPT) 31    (A) Abnormal value            CBC    CBC 7/14/21 12/29/21   WBC 6.13 5.52   RBC 4.60 4.48   Hemoglobin 13.7 13.1   Hematocrit 41.0 38.4   MCV 89.1 85.7   MCH 29.8 29.2   MCHC 33.4 34.1   RDW 12.5 12.3   Platelets 330 317           Lipid Panel    Lipid Panel 7/14/21 12/29/21   Total Cholesterol 207 (A) 207 (A)   Triglycerides 220 (A) 191 (A)   HDL Cholesterol 45 50   VLDL Cholesterol 39 34   LDL Cholesterol  123 (A) 123 (A)   LDL/HDL Ratio 2.62 2.38   (A) Abnormal value            TSH    TSH 7/14/21 12/29/21   TSH 1.270 2.400                     Assessment and Plan   Diagnoses and all orders for this visit:    1. Chronic idiopathic constipation (Primary)  Assessment & Plan:  1.  Take Colace twice daily  2.   Increase water intake  3.  Start probiotic twice daily  4.  Trial Trulance 3 mg daily, hold for diarrhea  5.  Refer for screening colonoscopy      2. Acquired hypothyroidism  Assessment & Plan:  1.  Continue Synthroid as prescribed  2.  Check labs today      3. Mixed hyperlipidemia  Assessment & Plan:  1.  Continue Zocor 20 mg daily  2.  Check lipid panel      4. Preventative health care  -     CBC & Differential  -     Comprehensive Metabolic Panel  -     Lipid Panel  -     TSH  -     IGP,Aptima HPV,Age Gdln    5. Screening for malignant neoplasm of colon  -     Ambulatory Referral For Screening Colonoscopy    6. Vitamin B12 deficiency  -     Vitamin B12    7. Vitamin D deficiency  -     Vitamin D 25 Hydroxy    8. Well woman exam with routine gynecological exam  Assessment & Plan:  1.  Pap smear completed without complications      9. Right groin pain  Assessment & Plan:  1.  Check UA    Orders:  -     POCT urinalysis dipstick, manual  -     Urine Culture - Urine, Urine, Clean Catch    10. Bilateral leg pain  Assessment & Plan:  1.  Likely muscular  2.  Encourage stretching and massage  3.  Warm soaks  4.  Increase water intake        Other orders  -     levothyroxine (Euthyrox) 75 MCG tablet; Take 1 tablet by mouth Daily.  Dispense: 90 tablet; Refill: 0  -     simvastatin (ZOCOR) 20 MG tablet; Take 1 tablet by mouth Every Night.  Dispense: 90 tablet; Refill: 1  -     saccharomyces boulardii (Florastor) 250 MG capsule; Take 1 capsule by mouth 2 (Two) Times a Day.  Dispense: 60 capsule; Refill: 1         I spent 40 minutes caring for Yuliet on this date of service. This time includes time spent by me in the following activities:preparing for the visit, reviewing tests, obtaining and/or reviewing a separately obtained history, performing a medically appropriate examination and/or evaluation , counseling and educating the patient/family/caregiver, ordering medications, tests, or procedures, documenting information in  the medical record, independently interpreting results and communicating that information with the patient/family/caregiver and care coordination  Follow Up   Return in about 6 months (around 12/8/2022) for Hypothyroidism.  Patient was given instructions and counseling regarding her condition or for health maintenance advice. Please see specific information pulled into the AVS if appropriate.     Counseling was given to patient for the following topics: diagnostic results, instructions for management, risk factor reductions, prognosis, patient and family education, impressions, risks and benefits of treatment options and importance of treatment compliance . Total time of the encounter was 30 minutes and 5 minutes was spent counseling.

## 2022-06-09 LAB
25(OH)D3 SERPL-MCNC: 49.5 NG/ML (ref 30–100)
ALBUMIN SERPL-MCNC: 4.6 G/DL (ref 3.5–5.2)
ALBUMIN/GLOB SERPL: 1.6 G/DL
ALP SERPL-CCNC: 111 U/L (ref 39–117)
ALT SERPL W P-5'-P-CCNC: 35 U/L (ref 1–33)
ANION GAP SERPL CALCULATED.3IONS-SCNC: 13.9 MMOL/L (ref 5–15)
AST SERPL-CCNC: 34 U/L (ref 1–32)
BACTERIA SPEC AEROBE CULT: NO GROWTH
BASOPHILS # BLD AUTO: 0.07 10*3/MM3 (ref 0–0.2)
BASOPHILS NFR BLD AUTO: 1 % (ref 0–1.5)
BILIRUB SERPL-MCNC: 0.6 MG/DL (ref 0–1.2)
BUN SERPL-MCNC: 17 MG/DL (ref 8–23)
BUN/CREAT SERPL: 16.5 (ref 7–25)
CALCIUM SPEC-SCNC: 9.7 MG/DL (ref 8.6–10.5)
CHLORIDE SERPL-SCNC: 103 MMOL/L (ref 98–107)
CHOLEST SERPL-MCNC: 206 MG/DL (ref 0–200)
CO2 SERPL-SCNC: 24.1 MMOL/L (ref 22–29)
CREAT SERPL-MCNC: 1.03 MG/DL (ref 0.57–1)
DEPRECATED RDW RBC AUTO: 40.3 FL (ref 37–54)
EGFRCR SERPLBLD CKD-EPI 2021: 60.8 ML/MIN/1.73
EOSINOPHIL # BLD AUTO: 0.17 10*3/MM3 (ref 0–0.4)
EOSINOPHIL NFR BLD AUTO: 2.3 % (ref 0.3–6.2)
ERYTHROCYTE [DISTWIDTH] IN BLOOD BY AUTOMATED COUNT: 12.5 % (ref 12.3–15.4)
GLOBULIN UR ELPH-MCNC: 2.8 GM/DL
GLUCOSE SERPL-MCNC: 97 MG/DL (ref 65–99)
HCT VFR BLD AUTO: 43 % (ref 34–46.6)
HDLC SERPL-MCNC: 52 MG/DL (ref 40–60)
HGB BLD-MCNC: 14.2 G/DL (ref 12–15.9)
IMM GRANULOCYTES # BLD AUTO: 0.02 10*3/MM3 (ref 0–0.05)
IMM GRANULOCYTES NFR BLD AUTO: 0.3 % (ref 0–0.5)
LDLC SERPL CALC-MCNC: 113 MG/DL (ref 0–100)
LDLC/HDLC SERPL: 2.04 {RATIO}
LYMPHOCYTES # BLD AUTO: 1.92 10*3/MM3 (ref 0.7–3.1)
LYMPHOCYTES NFR BLD AUTO: 26.1 % (ref 19.6–45.3)
MCH RBC QN AUTO: 29.2 PG (ref 26.6–33)
MCHC RBC AUTO-ENTMCNC: 33 G/DL (ref 31.5–35.7)
MCV RBC AUTO: 88.3 FL (ref 79–97)
MONOCYTES # BLD AUTO: 0.59 10*3/MM3 (ref 0.1–0.9)
MONOCYTES NFR BLD AUTO: 8 % (ref 5–12)
NEUTROPHILS NFR BLD AUTO: 4.58 10*3/MM3 (ref 1.7–7)
NEUTROPHILS NFR BLD AUTO: 62.3 % (ref 42.7–76)
NRBC BLD AUTO-RTO: 0 /100 WBC (ref 0–0.2)
PLATELET # BLD AUTO: 345 10*3/MM3 (ref 140–450)
PMV BLD AUTO: 11 FL (ref 6–12)
POTASSIUM SERPL-SCNC: 4.5 MMOL/L (ref 3.5–5.2)
PROT SERPL-MCNC: 7.4 G/DL (ref 6–8.5)
RBC # BLD AUTO: 4.87 10*6/MM3 (ref 3.77–5.28)
SODIUM SERPL-SCNC: 141 MMOL/L (ref 136–145)
TRIGL SERPL-MCNC: 239 MG/DL (ref 0–150)
TSH SERPL DL<=0.05 MIU/L-ACNC: 1.4 UIU/ML (ref 0.27–4.2)
VIT B12 BLD-MCNC: 1047 PG/ML (ref 211–946)
VLDLC SERPL-MCNC: 41 MG/DL (ref 5–40)
WBC NRBC COR # BLD: 7.35 10*3/MM3 (ref 3.4–10.8)

## 2022-06-09 NOTE — PROGRESS NOTES
Vitamin D level is normal.  Patient's vitamin B12 level remains high.  She could consider stopping vitamin B-12 supplementation altogether and just taking a multivitamin that has a standard dose of B12 in it.  Triglycerides are little higher than they were 6 months ago but the rest of her numbers have improved.  Her ratio is 2 which is good.  Continue Zocor at current dose.  The rest of her labs look good    The 10-year ASCVD risk score (Claradavid FLOOD Jr., et al., 2013) is: 6%    Values used to calculate the score:      Age: 66 years      Sex: Female      Is Non- : No      Diabetic: No      Tobacco smoker: No      Systolic Blood Pressure: 138 mmHg      Is BP treated: No      HDL Cholesterol: 52 mg/dL      Total Cholesterol: 206 mg/dL

## 2022-06-15 LAB
AGE GDLN ACOG TESTING: NORMAL
CYTOLOGIST CVX/VAG CYTO: NORMAL
CYTOLOGY CVX/VAG DOC CYTO: NORMAL
CYTOLOGY CVX/VAG DOC THIN PREP: NORMAL
DX ICD CODE: NORMAL
HIV 1 & 2 AB SER-IMP: NORMAL
HPV I/H RISK 4 DNA CVX QL PROBE+SIG AMP: NEGATIVE
Lab: NORMAL
OTHER STN SPEC: NORMAL
STAT OF ADQ CVX/VAG CYTO-IMP: NORMAL

## 2022-06-16 ENCOUNTER — TELEPHONE (OUTPATIENT)
Dept: FAMILY MEDICINE CLINIC | Facility: CLINIC | Age: 67
End: 2022-06-16

## 2022-06-16 NOTE — TELEPHONE ENCOUNTER
Left message for patient to call regarding results.    HUB TO READ:  Please let patient know that her Pap smear results are normal.

## 2022-06-16 NOTE — TELEPHONE ENCOUNTER
----- Message from KEREN Butterfield sent at 6/15/2022  2:59 PM EDT -----  Please let patient know that her Pap smear results are normal.

## 2022-06-22 ENCOUNTER — CLINICAL SUPPORT (OUTPATIENT)
Dept: FAMILY MEDICINE CLINIC | Facility: CLINIC | Age: 67
End: 2022-06-22

## 2022-06-22 DIAGNOSIS — R31.9 HEMATURIA, UNSPECIFIED TYPE: Primary | ICD-10-CM

## 2022-06-22 DIAGNOSIS — R82.998 LEUKOCYTES IN URINE: ICD-10-CM

## 2022-06-22 DIAGNOSIS — R80.9 PROTEINURIA, UNSPECIFIED TYPE: ICD-10-CM

## 2022-06-22 LAB
BILIRUB BLD-MCNC: NEGATIVE MG/DL
CLARITY, POC: CLEAR
COLOR UR: YELLOW
GLUCOSE UR STRIP-MCNC: NEGATIVE MG/DL
KETONES UR QL: NEGATIVE
LEUKOCYTE EST, POC: ABNORMAL
NITRITE UR-MCNC: NEGATIVE MG/ML
PH UR: 5 [PH] (ref 5–8)
PROT UR STRIP-MCNC: NEGATIVE MG/DL
RBC # UR STRIP: NEGATIVE /UL
SP GR UR: 1.02 (ref 1–1.03)
UROBILINOGEN UR QL: NORMAL

## 2022-06-22 PROCEDURE — 81002 URINALYSIS NONAUTO W/O SCOPE: CPT | Performed by: NURSE PRACTITIONER

## 2022-06-22 PROCEDURE — 87086 URINE CULTURE/COLONY COUNT: CPT | Performed by: NURSE PRACTITIONER

## 2022-06-23 LAB — BACTERIA SPEC AEROBE CULT: NO GROWTH

## 2022-08-04 ENCOUNTER — HOSPITAL ENCOUNTER (OUTPATIENT)
Dept: MAMMOGRAPHY | Facility: HOSPITAL | Age: 67
Discharge: HOME | End: 2022-08-04
Admitting: NURSE PRACTITIONER

## 2022-08-04 DIAGNOSIS — Z12.31 VISIT FOR SCREENING MAMMOGRAM: ICD-10-CM

## 2022-08-04 PROCEDURE — 77063 BREAST TOMOSYNTHESIS BI: CPT

## 2022-08-04 PROCEDURE — 77067 SCR MAMMO BI INCL CAD: CPT

## 2022-08-31 NOTE — ASSESSMENT & PLAN NOTE
1.  Pap smear completed without complications   Subjective   Patient ID: Shi is a 54 year old female.    Chief Complaint   Patient presents with   • Follow-up     Has questions on endoscopy done in June/ and mammogram questions/ left knee questions..     HPI    55 yo F presenting for 6 month follow up.      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    Review of Systems   Constitutional: Negative for fatigue and fever.   Respiratory: Negative for shortness of breath and wheezing.    Cardiovascular: Negative for chest pain and palpitations.   Musculoskeletal:        Knee pain       Visit Vitals  /63   Pulse (!) 113   Temp 97.3 °F (36.3 °C) (Tympanic)   Ht 5' 7.32\" (1.71 m)   Wt 108 kg (238 lb)   SpO2 96%   BMI 36.92 kg/m²     Objective   Physical Exam  Vitals reviewed.   Constitutional:       General: She is not in acute distress.     Appearance: She is not toxic-appearing or diaphoretic.   Cardiovascular:      Rate and Rhythm: Normal rate and regular rhythm.      Heart sounds: Normal heart sounds, S1 normal and S2 normal.   Pulmonary:      Effort:  Pulmonary effort is normal. No accessory muscle usage, prolonged expiration or respiratory distress.      Breath sounds: Normal breath sounds. No rhonchi or rales.   Neurological:      General: No focal deficit present.      Mental Status: She is alert and oriented to person, place, and time.   Psychiatric:         Attention and Perception: Attention normal.         Mood and Affect: Mood normal.         Speech: Speech normal.         Behavior: Behavior normal.       Shi was seen today for follow-up.    Diagnoses and all orders for this visit:    Type 2 diabetes mellitus without complication, without long-term current use of insulin (CMS/[facility])  -     Semaglutide 3 MG Tab; Take 1 tablet by mouth daily (before breakfast).  -     GLYCOHEMOGLOBIN; Future  -     MICROALBUMIN URINE RANDOM; Future    Class 2 severe obesity due to excess calories with serious comorbidity and body mass index (BMI) of 39.0 to 39.9 in adult (CMS/[facility])  -     Semaglutide 3 MG Tab; Take 1 tablet by mouth daily (before breakfast).    Chronic pain of left knee  -     SERVICE TO PHYSICAL THERAPY       Patient information  1.)  consider voltaren cream for th knee. Please consider PT for the knee  2.) Add on semaglutide to the metformin  3.) We will repeat the A1c with next blood work and given a urine sample  4.) Please go for an eye exam after the insurance change    Patient education completed on disease process, etiology & prognosis.    Patient expresses understanding of the plan.    Proper usage and side effects of medications reviewed & discussed.    Return to clinic as clinically indicated as discussed with patient who verbalized understanding of & agreement with the plan.

## 2022-10-11 ENCOUNTER — FLU SHOT (OUTPATIENT)
Dept: FAMILY MEDICINE CLINIC | Facility: CLINIC | Age: 67
End: 2022-10-11

## 2022-10-11 DIAGNOSIS — Z23 NEED FOR INFLUENZA VACCINATION: Primary | ICD-10-CM

## 2022-10-11 PROCEDURE — 90686 IIV4 VACC NO PRSV 0.5 ML IM: CPT | Performed by: NURSE PRACTITIONER

## 2022-10-11 PROCEDURE — 90471 IMMUNIZATION ADMIN: CPT | Performed by: NURSE PRACTITIONER

## 2022-10-17 RX ORDER — LEVOTHYROXINE SODIUM 0.07 MG/1
75 TABLET ORAL DAILY
Qty: 90 TABLET | Refills: 0 | Status: SHIPPED | OUTPATIENT
Start: 2022-10-17 | End: 2023-01-12

## 2022-10-17 RX ORDER — SIMVASTATIN 20 MG
20 TABLET ORAL NIGHTLY
Qty: 90 TABLET | Refills: 1 | Status: SHIPPED | OUTPATIENT
Start: 2022-10-17

## 2022-10-17 NOTE — TELEPHONE ENCOUNTER
Caller: July Yuleit KEVIN    Relationship: Self    Best call back number: [phone]    Requested Prescriptions:   Requested Prescriptions     Pending Prescriptions Disp Refills   • levothyroxine (Euthyrox) 75 MCG tablet 90 tablet 0     Sig: Take 1 tablet by mouth Daily.   • simvastatin (ZOCOR) 20 MG tablet 90 tablet 1     Sig: Take 1 tablet by mouth Every Night.        Pharmacy where request should be sent: [facility] [phone] [place], [place] [phone] FX     Additional details provided by patient: PATIENT IS OUT OF THE LEVOTHYROXINE.     Does the patient have less than a 3 day supply:  [x] Yes  [] No    Robby Tunrer Rep   10/17/22 08:43 EDT

## 2022-10-18 ENCOUNTER — TELEPHONE (OUTPATIENT)
Dept: FAMILY MEDICINE CLINIC | Facility: CLINIC | Age: 67
End: 2022-10-18

## 2022-10-18 DIAGNOSIS — L60.0 INGROWN TOENAIL: Primary | ICD-10-CM

## 2022-10-18 NOTE — TELEPHONE ENCOUNTER
I can send a referral over to podiatry.  In the meantime, I would recommend warm, Epsom salt soaks nightly

## 2022-10-18 NOTE — TELEPHONE ENCOUNTER
Pt calling stated that she is having issues with possible ingrown toe nail. She said the nail is curving around and the skin is a light pink color and swollen. She said it started just a few days ago and she wants to get it taken care of before it gets too bad. She is wondering about a referral to podiatry or if there is something else you recommend.

## 2022-10-18 NOTE — TELEPHONE ENCOUNTER
Caller: Yuliet Mcdowell    Relationship: Self    Best call back number: [phone]      What is the best time to reach you: ANYTIME    Who are you requesting to speak with (clinical staff, provider,  specific staff member): RADHA        What was the call regarding: PATIENT IS HAVING A NAIL ISSUES AND WANTED TO TALK TO RADHA ABOUT IT.    Do you require a callback: YES

## 2022-12-14 NOTE — PROGRESS NOTES
Venipuncture Blood Specimen Collection  Venipuncture performed by Maxine Brown MA with good hemostasis. Patient tolerated the procedure well without complications.   12/14/22   Maxine Brown MA

## 2023-01-12 RX ORDER — LEVOTHYROXINE SODIUM 0.07 MG/1
TABLET ORAL
Qty: 90 TABLET | Refills: 0 | Status: SHIPPED | OUTPATIENT
Start: 2023-01-12

## 2023-04-17 RX ORDER — LEVOTHYROXINE SODIUM 0.07 MG/1
TABLET ORAL
Qty: 90 TABLET | Refills: 0 | Status: SHIPPED | OUTPATIENT
Start: 2023-04-17

## 2023-04-18 RX ORDER — SIMVASTATIN 20 MG
TABLET ORAL
Qty: 90 TABLET | Refills: 0 | Status: SHIPPED | OUTPATIENT
Start: 2023-04-18

## 2023-04-20 RX ORDER — LEVOTHYROXINE SODIUM 0.07 MG/1
75 TABLET ORAL DAILY
Qty: 90 TABLET | Refills: 0 | OUTPATIENT
Start: 2023-04-20

## 2023-04-20 RX ORDER — SIMVASTATIN 20 MG
20 TABLET ORAL NIGHTLY
Qty: 90 TABLET | Refills: 0 | OUTPATIENT
Start: 2023-04-20

## 2023-08-02 ENCOUNTER — TRANSCRIBE ORDERS (OUTPATIENT)
Dept: ADMINISTRATIVE | Facility: HOSPITAL | Age: 68
End: 2023-08-02
Payer: COMMERCIAL

## 2023-08-02 DIAGNOSIS — Z12.31 SCREENING MAMMOGRAM FOR BREAST CANCER: Primary | ICD-10-CM

## 2023-08-07 ENCOUNTER — HOSPITAL ENCOUNTER (OUTPATIENT)
Dept: MAMMOGRAPHY | Facility: HOSPITAL | Age: 68
Discharge: HOME | End: 2023-08-07
Admitting: NURSE PRACTITIONER
Payer: COMMERCIAL

## 2023-08-07 DIAGNOSIS — Z12.31 SCREENING MAMMOGRAM FOR BREAST CANCER: ICD-10-CM

## 2023-08-07 PROCEDURE — 77067 SCR MAMMO BI INCL CAD: CPT

## 2023-08-07 PROCEDURE — 77063 BREAST TOMOSYNTHESIS BI: CPT

## 2023-08-10 RX ORDER — LEVOTHYROXINE SODIUM 0.07 MG/1
TABLET ORAL
Qty: 30 TABLET | Refills: 0 | OUTPATIENT
Start: 2023-08-10

## 2023-08-10 RX ORDER — SIMVASTATIN 20 MG
TABLET ORAL
Qty: 90 TABLET | Refills: 0 | OUTPATIENT
Start: 2023-08-10

## 2023-08-14 ENCOUNTER — CLINICAL SUPPORT (OUTPATIENT)
Dept: FAMILY MEDICINE CLINIC | Facility: CLINIC | Age: 68
End: 2023-08-14
Payer: COMMERCIAL

## 2023-08-14 DIAGNOSIS — E55.9 VITAMIN D DEFICIENCY: ICD-10-CM

## 2023-08-14 DIAGNOSIS — E78.2 MIXED HYPERLIPIDEMIA: ICD-10-CM

## 2023-08-14 DIAGNOSIS — E03.9 ACQUIRED HYPOTHYROIDISM: Primary | ICD-10-CM

## 2023-08-14 DIAGNOSIS — Z00.00 PREVENTATIVE HEALTH CARE: ICD-10-CM

## 2023-08-14 PROCEDURE — 36415 COLL VENOUS BLD VENIPUNCTURE: CPT | Performed by: NURSE PRACTITIONER

## 2023-08-14 PROCEDURE — 80050 GENERAL HEALTH PANEL: CPT | Performed by: NURSE PRACTITIONER

## 2023-08-14 PROCEDURE — 83036 HEMOGLOBIN GLYCOSYLATED A1C: CPT | Performed by: NURSE PRACTITIONER

## 2023-08-14 PROCEDURE — 80061 LIPID PANEL: CPT | Performed by: NURSE PRACTITIONER

## 2023-08-14 PROCEDURE — 82306 VITAMIN D 25 HYDROXY: CPT | Performed by: NURSE PRACTITIONER

## 2023-08-14 RX ORDER — SIMVASTATIN 20 MG
TABLET ORAL
Qty: 90 TABLET | Refills: 0 | Status: SHIPPED | OUTPATIENT
Start: 2023-08-14 | End: 2023-08-17 | Stop reason: SDUPTHER

## 2023-08-14 NOTE — TELEPHONE ENCOUNTER
Rx Refill Note  Requested Prescriptions     Pending Prescriptions Disp Refills    simvastatin (ZOCOR) 20 MG tablet [Pharmacy Med Name: Simvastatin 20 MG Oral Tablet] 90 tablet 0     Sig: TAKE 1 TABLET BY MOUTH ONCE DAILY AT NIGHT      Last office visit with prescribing clinician: 6/8/2022   Last telemedicine visit with prescribing clinician: Visit date not found   Next office visit with prescribing clinician: 8/17/2023                         Would you like a call back once the refill request has been completed: [] Yes [] No    If the office needs to give you a call back, can they leave a voicemail: [] Yes [] No    Guru Barfield MA  08/14/23, 13:26 EDT

## 2023-08-14 NOTE — PROGRESS NOTES
Venipuncture Blood Specimen Collection  Venipuncture performed in the right arm by Maxine Brown MA with good hemostasis. Patient tolerated the procedure well without complications.   08/14/23   Maxine Bronw MA

## 2023-08-15 LAB
25(OH)D3 SERPL-MCNC: 37.8 NG/ML (ref 30–100)
ALBUMIN SERPL-MCNC: 4.4 G/DL (ref 3.5–5.2)
ALBUMIN/GLOB SERPL: 2.1 G/DL
ALP SERPL-CCNC: 94 U/L (ref 39–117)
ALT SERPL W P-5'-P-CCNC: 34 U/L (ref 1–33)
ANION GAP SERPL CALCULATED.3IONS-SCNC: 11.8 MMOL/L (ref 5–15)
AST SERPL-CCNC: 33 U/L (ref 1–32)
BILIRUB SERPL-MCNC: 0.4 MG/DL (ref 0–1.2)
BUN SERPL-MCNC: 13 MG/DL (ref 8–23)
BUN/CREAT SERPL: 15.1 (ref 7–25)
CALCIUM SPEC-SCNC: 8.9 MG/DL (ref 8.6–10.5)
CHLORIDE SERPL-SCNC: 104 MMOL/L (ref 98–107)
CHOLEST SERPL-MCNC: 190 MG/DL (ref 0–200)
CO2 SERPL-SCNC: 25.2 MMOL/L (ref 22–29)
CREAT SERPL-MCNC: 0.86 MG/DL (ref 0.57–1)
DEPRECATED RDW RBC AUTO: 40.5 FL (ref 37–54)
EGFRCR SERPLBLD CKD-EPI 2021: 75.1 ML/MIN/1.73
ERYTHROCYTE [DISTWIDTH] IN BLOOD BY AUTOMATED COUNT: 12.6 % (ref 12.3–15.4)
GLOBULIN UR ELPH-MCNC: 2.1 GM/DL
GLUCOSE SERPL-MCNC: 93 MG/DL (ref 65–99)
HBA1C MFR BLD: 5.3 % (ref 4.8–5.6)
HCT VFR BLD AUTO: 38.9 % (ref 34–46.6)
HDLC SERPL-MCNC: 48 MG/DL (ref 40–60)
HGB BLD-MCNC: 13 G/DL (ref 12–15.9)
LDLC SERPL CALC-MCNC: 114 MG/DL (ref 0–100)
LDLC/HDLC SERPL: 2.3 {RATIO}
MCH RBC QN AUTO: 29.4 PG (ref 26.6–33)
MCHC RBC AUTO-ENTMCNC: 33.4 G/DL (ref 31.5–35.7)
MCV RBC AUTO: 88 FL (ref 79–97)
PLATELET # BLD AUTO: 281 10*3/MM3 (ref 140–450)
PMV BLD AUTO: 11.1 FL (ref 6–12)
POTASSIUM SERPL-SCNC: 4.3 MMOL/L (ref 3.5–5.2)
PROT SERPL-MCNC: 6.5 G/DL (ref 6–8.5)
RBC # BLD AUTO: 4.42 10*6/MM3 (ref 3.77–5.28)
SODIUM SERPL-SCNC: 141 MMOL/L (ref 136–145)
TRIGL SERPL-MCNC: 158 MG/DL (ref 0–150)
TSH SERPL DL<=0.05 MIU/L-ACNC: 2.01 UIU/ML (ref 0.27–4.2)
VLDLC SERPL-MCNC: 28 MG/DL (ref 5–40)
WBC NRBC COR # BLD: 6.22 10*3/MM3 (ref 3.4–10.8)

## 2023-08-17 ENCOUNTER — OFFICE VISIT (OUTPATIENT)
Dept: FAMILY MEDICINE CLINIC | Facility: CLINIC | Age: 68
End: 2023-08-17
Payer: COMMERCIAL

## 2023-08-17 VITALS
DIASTOLIC BLOOD PRESSURE: 68 MMHG | BODY MASS INDEX: 33.86 KG/M2 | OXYGEN SATURATION: 98 % | SYSTOLIC BLOOD PRESSURE: 132 MMHG | HEART RATE: 62 BPM | HEIGHT: 62 IN | WEIGHT: 184 LBS

## 2023-08-17 DIAGNOSIS — Z00.00 PREVENTATIVE HEALTH CARE: ICD-10-CM

## 2023-08-17 DIAGNOSIS — E03.9 ACQUIRED HYPOTHYROIDISM: ICD-10-CM

## 2023-08-17 DIAGNOSIS — H53.131 VISION, LOSS, SUDDEN, RIGHT: ICD-10-CM

## 2023-08-17 DIAGNOSIS — E78.2 MIXED HYPERLIPIDEMIA: ICD-10-CM

## 2023-08-17 DIAGNOSIS — Z78.0 ASYMPTOMATIC MENOPAUSAL STATE: ICD-10-CM

## 2023-08-17 DIAGNOSIS — R23.2 HOT FLASHES: ICD-10-CM

## 2023-08-17 DIAGNOSIS — Z00.00 ANNUAL PHYSICAL EXAM: Primary | ICD-10-CM

## 2023-08-17 DIAGNOSIS — L29.9 PRURITUS: ICD-10-CM

## 2023-08-17 RX ORDER — SIMVASTATIN 20 MG
20 TABLET ORAL NIGHTLY
Qty: 90 TABLET | Refills: 3 | Status: SHIPPED | OUTPATIENT
Start: 2023-08-17

## 2023-08-17 RX ORDER — LEVOTHYROXINE SODIUM 0.07 MG/1
75 TABLET ORAL DAILY
Qty: 90 TABLET | Refills: 3 | Status: SHIPPED | OUTPATIENT
Start: 2023-08-17

## 2023-08-17 NOTE — PROGRESS NOTES
Chief Complaint  Annual Exam, Itching (On and off on her arms and legs. Not all of the time, but at times it gets really bad. ), Hot Flashes (Still having on and off ), Neck Pain (At the base of head down the neck. ), and Loss of Vision (On Friday lost vision on the right side when she was watching TV/this last for about 20 minutes and then returned to normal. )    Subjective        Yuliet Mcdowell presents to [facility]  History of Present Illness    Patient presents for Annual Exam. She is taking Synthroid 75 mcg daily for hypothyroidism.  Patient has hyperlipidemia, taking Zocor 20 mg daily.    Patient is complaining of intermittent itching to bilateral arms and legs. No known aggravating or alleviating factors. Patient denies accompanying rash.     Patient is having intermittent hot flashes. She reports episodes occur a few times weekly.     Patient reports that on Friday, she lost vision to her right eye for approximately 20 minutes. Patient describes that she could only see 1/2 of her television screen for about 15-20 minutes. She denies any eye pain, dizziness, headache, facial droop, speech changes or weakness. Patient did not go to ER.      Patient referred for Colonoscopy, has not scheduled. Mammogram completed in August, showed no signs of malignancy.     Advance Care Planning   ACP discussion was held with the patient during this visit. Patient has an advance directive (not in EMR), copy requested.      PHQ-9 Depression Screening  Little interest or pleasure in doing things? 0-->not at all   Feeling down, depressed, or hopeless? 0-->not at all   Trouble falling or staying asleep, or sleeping too much?     Feeling tired or having little energy?     Poor appetite or overeating?     Feeling bad about yourself - or that you are a failure or have let yourself or your family down?     Trouble concentrating on things, such as reading the newspaper or watching television?     Moving  "or speaking so slowly that other people could have noticed? Or the opposite - being so fidgety or restless that you have been moving around a lot more than usual?     Thoughts that you would be better off dead, or of hurting yourself in some way?     PHQ-9 Total Score 0   If you checked off any problems, how difficult have these problems made it for you to do your work, take care of things at home, or get along with other people?        Objective   Vital Signs:  /68 (BP Location: Left arm, Patient Position: Sitting, Cuff Size: Adult)   Pulse 62   Ht 157.5 cm (62\")   Wt 83.5 kg (184 lb)   SpO2 98%   BMI 33.65 kg/mý   Estimated body mass index is 33.65 kg/mý as calculated from the following:    Height as of this encounter: 157.5 cm (62\").    Weight as of this encounter: 83.5 kg (184 lb).       BMI is >= 30 and <35. (Class 1 Obesity). The following options were offered after discussion;: exercise counseling/recommendations and nutrition counseling/recommendations      Physical Exam  Constitutional:       Appearance: Normal appearance.   HENT:      Head: Normocephalic.      Right Ear: Tympanic membrane normal.      Left Ear: Tympanic membrane normal.      Mouth/Throat:      Pharynx: Oropharynx is clear.   Eyes:      Pupils: Pupils are equal, round, and reactive to light.   Cardiovascular:      Rate and Rhythm: Normal rate and regular rhythm.   Pulmonary:      Effort: Pulmonary effort is normal.      Breath sounds: Normal breath sounds.   Abdominal:      General: Abdomen is flat. Bowel sounds are normal.      Palpations: Abdomen is soft.   Musculoskeletal:         General: Normal range of motion.      Cervical back: Neck supple.      Right lower leg: No edema.      Left lower leg: No edema.   Skin:     General: Skin is warm and dry.   Neurological:      Mental Status: She is alert and oriented to person, place, and time.      Gait: Gait is intact.   Psychiatric:         Attention and Perception: Attention " normal.         Mood and Affect: Mood normal.         Speech: Speech normal.      Result Review :    CMP          8/14/2023    08:38   CMP   Glucose 93    BUN 13    Creatinine 0.86    EGFR 75.1    Sodium 141    Potassium 4.3    Chloride 104    Calcium 8.9    Total Protein 6.5    Albumin 4.4    Globulin 2.1    Total Bilirubin 0.4    Alkaline Phosphatase 94    AST (SGOT) 33    ALT (SGPT) 34    Albumin/Globulin Ratio 2.1    BUN/Creatinine Ratio 15.1    Anion Gap 11.8      CBC          8/14/2023    08:38   CBC   WBC 6.22    RBC 4.42    Hemoglobin 13.0    Hematocrit 38.9    MCV 88.0    MCH 29.4    MCHC 33.4    RDW 12.6    Platelets 281      Lipid Panel          8/14/2023    08:38   Lipid Panel   Total Cholesterol 190    Triglycerides 158    HDL Cholesterol 48    VLDL Cholesterol 28    LDL Cholesterol  114    LDL/HDL Ratio 2.30      TSH          8/14/2023    08:38   TSH   TSH 2.010      Most Recent A1C          8/14/2023    08:38   HGBA1C Most Recent   Hemoglobin A1C 5.30                   Assessment and Plan   Diagnoses and all orders for this visit:    1. Annual physical exam (Primary)  Assessment & Plan:  Mammogram up-to-date  Patient encouraged to schedule colonoscopy  DEXA scan ordered  Labs reviewed  Well-balanced diet recommended  Recommended routine dental and vision screenings      2. Acquired hypothyroidism  Assessment & Plan:  Labs reviewed  Continue levothyroxine as prescribed      3. Preventative health care    4. Vision, loss, sudden, right  Assessment & Plan:  MRI ordered  Patient to schedule eye exam    Orders:  -     MRI Angiogram Head Without Contrast; Future    5. Asymptomatic menopausal state  -     DEXA Bone Density Axial; Future    6. Mixed hyperlipidemia  Assessment & Plan:  Labs reviewed  Continue Zocor as prescribed      7. Hot flashes  Assessment & Plan:  Discussed possible treatment modalities -consider Effexor.  Patient to call if persists or worsens      8. Pruritus  Assessment &  Plan:  Increase water intake  Apply lotion regularly to prevent dry skin  Consider daily antihistamine      Other orders  -     levothyroxine (SYNTHROID, LEVOTHROID) 75 MCG tablet; Take 1 tablet by mouth Daily.  Dispense: 90 tablet; Refill: 3  -     simvastatin (ZOCOR) 20 MG tablet; Take 1 tablet by mouth Every Night.  Dispense: 90 tablet; Refill: 3  -     ciclopirox (Penlac) 8 % solution; Apply  topically to the appropriate area as directed Every Night.  Dispense: 1 each; Refill: 1           I spent 45 minutes caring for Yuliet on this date of service. This time includes time spent by me in the following activities:preparing for the visit, reviewing tests, obtaining and/or reviewing a separately obtained history, performing a medically appropriate examination and/or evaluation , counseling and educating the patient/family/caregiver, ordering medications, tests, or procedures, documenting information in the medical record, and care coordination  Follow Up   Return in about 1 year (around 8/17/2024) for Annual physical.  Patient was given instructions and counseling regarding her condition or for health maintenance advice. Please see specific information pulled into the AVS if appropriate.     Counseling was given to patient for the following topics: diagnostic results, instructions for management, risk factor reductions, prognosis, patient and family education, impressions, risks and benefits of treatment options, and importance of treatment compliance . Total time of the encounter was 30 minutes and 10 minutes was spent counseling.

## 2023-08-17 NOTE — ASSESSMENT & PLAN NOTE
Mammogram up-to-date  Patient encouraged to schedule colonoscopy  DEXA scan ordered  Labs reviewed  Well-balanced diet recommended  Recommended routine dental and vision screenings

## 2023-08-17 NOTE — ASSESSMENT & PLAN NOTE
Discussed possible treatment modalities -consider Effexor.  Patient to call if persists or worsens

## 2023-08-19 DIAGNOSIS — H53.131 VISION, LOSS, SUDDEN, RIGHT: Primary | ICD-10-CM

## 2023-08-22 ENCOUNTER — HOSPITAL ENCOUNTER (OUTPATIENT)
Dept: CT IMAGING | Facility: HOSPITAL | Age: 68
Discharge: HOME | End: 2023-08-22
Admitting: NURSE PRACTITIONER
Payer: COMMERCIAL

## 2023-08-22 DIAGNOSIS — H53.131 VISION, LOSS, SUDDEN, RIGHT: ICD-10-CM

## 2023-08-22 PROCEDURE — 25510000001 IOPAMIDOL PER 1 ML: Performed by: NURSE PRACTITIONER

## 2023-08-22 PROCEDURE — 70496 CT ANGIOGRAPHY HEAD: CPT

## 2023-08-22 RX ADMIN — IOPAMIDOL 100 ML: 755 INJECTION, SOLUTION INTRAVENOUS at 10:48

## 2023-08-22 NOTE — PROGRESS NOTES
Please let patient know her scan is normal.  Schedule an eye exam and if episode occurs again, call or go to ER

## 2023-10-20 ENCOUNTER — ON CAMPUS - OUTPATIENT (OUTPATIENT)
Dept: URBAN - METROPOLITAN AREA HOSPITAL 2 | Facility: HOSPITAL | Age: 68
End: 2023-10-20
Payer: COMMERCIAL

## 2023-10-20 ENCOUNTER — OFFICE (OUTPATIENT)
Dept: URBAN - METROPOLITAN AREA PATHOLOGY 4 | Facility: PATHOLOGY | Age: 68
End: 2023-10-20
Payer: COMMERCIAL

## 2023-10-20 VITALS
DIASTOLIC BLOOD PRESSURE: 101 MMHG | HEART RATE: 64 BPM | SYSTOLIC BLOOD PRESSURE: 102 MMHG | SYSTOLIC BLOOD PRESSURE: 95 MMHG | TEMPERATURE: 98.1 F | HEIGHT: 62 IN | HEART RATE: 65 BPM | HEART RATE: 75 BPM | RESPIRATION RATE: 16 BRPM | SYSTOLIC BLOOD PRESSURE: 110 MMHG | OXYGEN SATURATION: 98 % | HEART RATE: 53 BPM | SYSTOLIC BLOOD PRESSURE: 114 MMHG | HEART RATE: 71 BPM | DIASTOLIC BLOOD PRESSURE: 52 MMHG | DIASTOLIC BLOOD PRESSURE: 65 MMHG | DIASTOLIC BLOOD PRESSURE: 64 MMHG | DIASTOLIC BLOOD PRESSURE: 51 MMHG | RESPIRATION RATE: 18 BRPM | SYSTOLIC BLOOD PRESSURE: 101 MMHG | DIASTOLIC BLOOD PRESSURE: 82 MMHG | DIASTOLIC BLOOD PRESSURE: 55 MMHG | SYSTOLIC BLOOD PRESSURE: 109 MMHG | HEART RATE: 72 BPM | SYSTOLIC BLOOD PRESSURE: 106 MMHG | SYSTOLIC BLOOD PRESSURE: 118 MMHG | DIASTOLIC BLOOD PRESSURE: 70 MMHG | OXYGEN SATURATION: 96 % | SYSTOLIC BLOOD PRESSURE: 141 MMHG | HEART RATE: 66 BPM | RESPIRATION RATE: 17 BRPM | WEIGHT: 178 LBS | OXYGEN SATURATION: 97 % | DIASTOLIC BLOOD PRESSURE: 66 MMHG

## 2023-10-20 DIAGNOSIS — Z09 ENCOUNTER FOR FOLLOW-UP EXAMINATION AFTER COMPLETED TREATMEN: ICD-10-CM

## 2023-10-20 DIAGNOSIS — D12.3 BENIGN NEOPLASM OF TRANSVERSE COLON: ICD-10-CM

## 2023-10-20 DIAGNOSIS — D12.5 BENIGN NEOPLASM OF SIGMOID COLON: ICD-10-CM

## 2023-10-20 DIAGNOSIS — Z86.010 PERSONAL HISTORY OF COLONIC POLYPS: ICD-10-CM

## 2023-10-20 PROBLEM — K63.5 POLYP OF COLON: Status: ACTIVE | Noted: 2023-10-20

## 2023-10-20 LAB
GI HISTOLOGY: A. UNSPECIFIED: (no result)
GI HISTOLOGY: B. UNSPECIFIED: (no result)
GI HISTOLOGY: PDF REPORT: (no result)

## 2023-10-20 PROCEDURE — 45385 COLONOSCOPY W/LESION REMOVAL: CPT | Mod: 33 | Performed by: INTERNAL MEDICINE

## 2023-10-20 PROCEDURE — 88305 TISSUE EXAM BY PATHOLOGIST: CPT | Performed by: INTERNAL MEDICINE

## 2023-12-05 ENCOUNTER — APPOINTMENT (OUTPATIENT)
Dept: GENERAL RADIOLOGY | Facility: HOSPITAL | Age: 68
End: 2023-12-05
Payer: COMMERCIAL

## 2023-12-05 ENCOUNTER — HOSPITAL ENCOUNTER (EMERGENCY)
Facility: HOSPITAL | Age: 68
Discharge: HOME | End: 2023-12-05
Payer: COMMERCIAL

## 2023-12-05 VITALS
HEIGHT: 62 IN | TEMPERATURE: 98 F | DIASTOLIC BLOOD PRESSURE: 70 MMHG | RESPIRATION RATE: 18 BRPM | HEART RATE: 64 BPM | WEIGHT: 188.27 LBS | BODY MASS INDEX: 34.65 KG/M2 | OXYGEN SATURATION: 99 % | SYSTOLIC BLOOD PRESSURE: 126 MMHG

## 2023-12-05 DIAGNOSIS — R20.2 PARESTHESIA: Primary | ICD-10-CM

## 2023-12-05 DIAGNOSIS — R93.7 ABNORMAL X-RAY OF CERVICAL SPINE: ICD-10-CM

## 2023-12-05 LAB
BASOPHILS # BLD AUTO: 0.1 10*3/MM3 (ref 0–0.2)
BASOPHILS NFR BLD AUTO: 1.2 % (ref 0–1.5)
CRP SERPL-MCNC: <0.3 MG/DL (ref 0–0.5)
DEPRECATED RDW RBC AUTO: 42.4 FL (ref 37–54)
EOSINOPHIL # BLD AUTO: 0.2 10*3/MM3 (ref 0–0.4)
EOSINOPHIL NFR BLD AUTO: 3.6 % (ref 0.3–6.2)
ERYTHROCYTE [DISTWIDTH] IN BLOOD BY AUTOMATED COUNT: 13.2 % (ref 12.3–15.4)
ERYTHROCYTE [SEDIMENTATION RATE] IN BLOOD: 6 MM/HR (ref 0–30)
HCT VFR BLD AUTO: 38.2 % (ref 34–46.6)
HGB BLD-MCNC: 12.9 G/DL (ref 12–15.9)
LYMPHOCYTES # BLD AUTO: 1.6 10*3/MM3 (ref 0.7–3.1)
LYMPHOCYTES NFR BLD AUTO: 26.9 % (ref 19.6–45.3)
MCH RBC QN AUTO: 29.6 PG (ref 26.6–33)
MCHC RBC AUTO-ENTMCNC: 33.7 G/DL (ref 31.5–35.7)
MCV RBC AUTO: 87.9 FL (ref 79–97)
MONOCYTES # BLD AUTO: 0.5 10*3/MM3 (ref 0.1–0.9)
MONOCYTES NFR BLD AUTO: 8.6 % (ref 5–12)
NEUTROPHILS NFR BLD AUTO: 3.7 10*3/MM3 (ref 1.7–7)
NEUTROPHILS NFR BLD AUTO: 59.7 % (ref 42.7–76)
NRBC BLD AUTO-RTO: 0.1 /100 WBC (ref 0–0.2)
PLATELET # BLD AUTO: 279 10*3/MM3 (ref 140–450)
PMV BLD AUTO: 8.5 FL (ref 6–12)
RBC # BLD AUTO: 4.35 10*6/MM3 (ref 3.77–5.28)
WBC NRBC COR # BLD AUTO: 6.1 10*3/MM3 (ref 3.4–10.8)

## 2023-12-05 PROCEDURE — 99283 EMERGENCY DEPT VISIT LOW MDM: CPT

## 2023-12-05 PROCEDURE — 85652 RBC SED RATE AUTOMATED: CPT | Performed by: NURSE PRACTITIONER

## 2023-12-05 PROCEDURE — 85025 COMPLETE CBC W/AUTO DIFF WBC: CPT | Performed by: NURSE PRACTITIONER

## 2023-12-05 PROCEDURE — 86140 C-REACTIVE PROTEIN: CPT | Performed by: NURSE PRACTITIONER

## 2023-12-05 PROCEDURE — 72050 X-RAY EXAM NECK SPINE 4/5VWS: CPT

## 2023-12-05 RX ORDER — PREDNISONE 20 MG/1
20 TABLET ORAL DAILY
Qty: 7 TABLET | Refills: 0 | Status: SHIPPED | OUTPATIENT
Start: 2023-12-05

## 2023-12-05 NOTE — DISCHARGE INSTRUCTIONS
Medications as directed.  Follow-up with spine center.  May need physical therapy.  Return for any new or worsening symptoms

## 2023-12-05 NOTE — ED PROVIDER NOTES
Subjective   History of Present Illness  Chief complaint: Scalp pain      Context: 68-year-old female comes in with significant other with complaints of right sharp stabbing intermittent pain behind her right ear for the last 2 days.  She was seen in urgent care yesterday and given a prescription for valacyclovir which she has not started.  She denies any cough congestion fever dental pain.  No recent trauma or injury.  She states approximately a week ago she did have some sharp stabbing pain in her right anterior thigh without weakness.  No saddle anesthesia bowel or bladder incontinence or retention.  She denies any hearing loss or rash.  Denies any arm involvement.        PCP: [place]           Review of Systems   Constitutional:  Negative for fever.       Past Medical History:   Diagnosis Date    Hyperlipidemia     Hypothyroidism        No Known Allergies    Past Surgical History:   Procedure Laterality Date     SECTION         Family History   Problem Relation Age of Onset    Cancer Mother     Cancer Father     Heart disease Sister     Heart attack Maternal Grandmother        Social History     Socioeconomic History    Marital status:    Tobacco Use    Smoking status: Never    Smokeless tobacco: Never   Vaping Use    Vaping Use: Never used   Substance and Sexual Activity    Alcohol use: Not Currently    Drug use: Never    Sexual activity: Defer           Objective   Physical Exam    Due to significant overcrowding in the emergency department patient was evaluated by myself in a hallway bed.  Explained to the patient our limitations due to overcrowding and they were in agreement to continue exam and treatment at this time.     Vital signs and traige nurse note reviewed.  Constitutional:  Awake, alert; well developed and well nourished.  No acute distress, the patient is examined in hospital gown.  HEENT:  Normocephalic, atraumatic;  with intact EOM; oropharynx is pink and moist without edema or  erythema.  Neck: Supple, full range of motion without pain; no cervical lymphadenopathy.  Cardiovascular: Regular rate and rhythm, normal S1-S2. .  Pulmonary: Respiratory effort regular, nonlabored; breath sounds CTA without wheezing or rhonchi.  Musculoskeletal: Independent range of motion of all extremities, no palpable tenderness or edema.   Back:  Spine is midline and without midline tenderness on palpation of cervical, thoracic, and lumbar spine; paraspinal musculature is nontender and without soft tissue swelling, overlying ecchymosis or erythema. ROM is without pain,  Distal muscle strength is 5/5.  Neuro: Alert oriented x3, speech is clear and appropriate.  strong and equal dorsiflexion of bilateral great toes L4, L5, S1 motor sensory intact.      Procedures           ED Course      Labs Reviewed   SEDIMENTATION RATE - Normal   CBC WITH AUTO DIFFERENTIAL - Normal   C-REACTIVE PROTEIN   CBC AND DIFFERENTIAL    Narrative:     The following orders were created for panel order CBC & Differential.  Procedure                               Abnormality         Status                     ---------                               -----------         ------                     CBC Auto Differential[[id number]]        Normal              Final result               Scan Slide[[id number]]                                                                    Please view results for these tests on the individual orders.     Medications - No data to display  XR Spine Cervical Complete 4 or 5 View    Result Date: 12/5/2023  Impression: 1. Height and alignment are preserved 2. Mild multilevel degenerative changes of the cervical spine as above. Electronically Signed: Den Gomez MD  12/5/2023 7:37 AM EST  Workstation ID: [id number]   Prior to Admission medications    Medication Sig Start Date End Date Taking? Authorizing Provider   ciclopirox (Penlac) 8 % solution Apply  topically to the appropriate area as directed Every Night.  "8/17/23   Brittany Junior APRN   Docusate Calcium (STOOL SOFTENER PO) Take  by mouth.    ProviderSandie MD   levothyroxine (SYNTHROID, LEVOTHROID) 75 MCG tablet Take 1 tablet by mouth Daily. 8/17/23   Brittany Junior APRN   Loratadine (CLARITIN PO) Take 1 tablet by mouth Daily.    ProviderSandie MD   multivitamin (THERAGRAN) tablet tablet Take  by mouth Daily.    ProviderSandie MD   predniSONE (DELTASONE) 20 MG tablet Take 1 tablet by mouth Daily. 12/5/23   Tayla Mejia APRN   simvastatin (ZOCOR) 20 MG tablet Take 1 tablet by mouth Every Night. 8/17/23   Brittany Junior APRN   valACYclovir (VALTREX) 1000 MG tablet Take 1 tablet by mouth 3 (Three) Times a Day for 7 days. 12/4/23 12/11/23  Servando Vega MD   VITAMIN D PO Take  by mouth.    Provider, MD Sandie                                            Medical Decision Making      /57   Pulse 64   Temp 98.3 °F (36.8 °C)   Resp 18   Ht 157.5 cm (62\")   Wt 85.4 kg (188 lb 4.4 oz)   LMP 02/01/2014 (Approximate) Comment: post menopausal   SpO2 99%   BMI 34.44 kg/m²      Chart review: Dr. Harper urgent care note from yesterday prescription for valacyclovir    Radiology interpretation:  X-rays reviewed independently and interpreted by me:  Further interpretation by radiologist as above  Lab interpretation:  Labs all viewed by me and significant for, ESR 6, white count 6.1, CRP pending on discharge       Appropriate PPE worn during exam.  Saw patient in hallway due to overcrowding and ER census.  Differentials including temporal arteritis occipital neuralgia cervical radiculopathy and zoster considered.  Cauda equina and epidural abscess felt unlikely based on HPI and physical exam.  She was noted to have some degenerative changes on her spine imaging and states she was told many years ago at an unknown facility she had imaging on her neck and was told she had a bulging disc.  Denies any injury.  She will be " discharged home with prednisone and given referral for spine center for follow-up.         i discussed findings with patient who voices understanding of discharge instructions, signs and symptoms requiring return to ED; discharged improved and in stable condition with follow up for re-evaluation.  This document is intended for medical expert use only. Reading of this document by patients and/or patient's family without participating medical staff guidance may result in misinterpretation and unintended morbidity.  Any interpretation of such data is the responsibility of the patient and/or family member responsible for the patient in concert with their primary or specialist providers, not to be left for sources of online searches such as Anybots, Botanical Tans or similar queries. Relying on these approaches to knowledge may result in misinterpretation, misguided goals of care and even death should patients or family members try recommendations outside of the realm of professional medical care in a supervised inpatient environment.         Problems Addressed:  Abnormal x-ray of cervical spine: complicated acute illness or injury  Paresthesia: complicated acute illness or injury    Amount and/or Complexity of Data Reviewed  Labs: ordered.  Radiology: ordered.    Risk  Prescription drug management.        Final diagnoses:   Paresthesia   Abnormal x-ray of cervical spine       ED Disposition  ED Disposition       ED Disposition   Discharge    Condition   Stable    Comment   --               [facility]  [address] [place] [id number] [phone]  Schedule an appointment as soon as possible for a visit       Brittany Junior, APRSHUKRI  [address] [address]  [address] [place] [address] [place] [address] [phone]               Medication List        New Prescriptions      predniSONE 20 MG tablet  Commonly known as: DELTASONE  Take 1 tablet by mouth Daily.               Where to Get Your Medications         These medications were sent to [id number] - [place], [place] - [address] - [phone]  - [phone]   [address] [facility] [place] [address]      Phone: [phone]   predniSONE 20 MG tablet            Tayla Mejia, APRN  12/05/23 0908

## 2023-12-06 ENCOUNTER — TELEPHONE (OUTPATIENT)
Dept: URGENT CARE | Facility: CLINIC | Age: 68
End: 2023-12-06
Payer: COMMERCIAL

## 2023-12-06 NOTE — TELEPHONE ENCOUNTER
Please contact the patient.    I see that she went to the ER for her symptoms.    Please check to see how she's doing and make sure she has contacted her PCP.    Let me know if I can help.    Thanks.

## 2023-12-07 ENCOUNTER — TELEPHONE (OUTPATIENT)
Dept: FAMILY MEDICINE CLINIC | Facility: CLINIC | Age: 68
End: 2023-12-07
Payer: COMMERCIAL

## 2023-12-07 NOTE — TELEPHONE ENCOUNTER
It looks like patient was referred to the spine center for follow-up as they noted degenerative changes to her C-spine.  She can follow-up with me if persists or needs any additional help with the neck pain or she can follow-up with spine center.  If she was not admitted, a hospital follow-up is not necessary

## 2023-12-07 NOTE — PROGRESS NOTES
"Subjective   History of Present Illness: Yuliet Mcdowell is a 68 y.o. female is being seen for consultation today at the request of KEREN Galvez    History of Present Illness    {Common H&P Review Areas:44363}    Review of Systems    Objective     .LMP 02/01/2014 (Approximate) Comment: post menopausal    There is no height or weight on file to calculate BMI.    There were no vitals filed for this visit.       Physical Exam  Neurologic Exam      Assessment & Plan   Independent Review of Radiographic Studies:      I personally reviewed the images from the following studies.    ***    Medical Decision Making:      Yuliet Amatotis a 68 y.o. female           There are no diagnoses linked to this encounter.  No follow-ups on file.    This patient was examined wearing appropriate personal protective equipment.     Yuliet Mcdowell  reports that she has never smoked. She has never used smokeless tobacco.. I have educated her on the risk of diseases from using tobacco products such as {Tobacco Cessation Diseases:28555::\"cancer\",\"COPD\",\"heart disease\"}.     I advised her to quit and she is {Willing/Not Willing to Quit Tobacco Products:34719}.    I spent {Time Spent Tobacco :91695} minutes counseling the patient.         There is no height or weight on file to calculate BMI.      Patient's blood pressure was reviewed.  Recommendations for  a low-salt diet and exercise to maintain/improve BP in addition to taking any presribed medications.    Advance Care Planning   {Advance Directive Status:44681}         Tamica Limon, PCT  12/07/23  09:20 EST  "

## 2023-12-07 NOTE — TELEPHONE ENCOUNTER
"Caller: Yuliet Mcdowell \"NAOMI\"    Relationship: Self    Best call back number: [phone]      PATIENT WAS SEEN IN THE EMERGENCY ROOM TUESDAY FOR NECK PAIN. SHE WAS NOT ADMITTED.     SHE SEES HER NEUROLOGIST 12/12/23 FOR A NEW PATIENT APPOINTMENT AND WASN'T SURE IF SHE NEEDED TO COM E IN AND SEE GEORGE IN OFFICE OR NOT, AND THOUGHT IF SO, WAITING TIL AFTER THIS NEUROLOGY APPOINTMENT WOULD BE IDEAL.    PLEASE GIVE PATIENT A CALLBACK   "

## 2023-12-11 NOTE — PROGRESS NOTES
Subjective     Chief Complaint   Patient presents with    Neck Pain     New evaluation         Previous Treatment: Prednisone, Tylenol    HPI: Yuliet Mcdowell is a 68 y.o. female with hypothyroidism who was referred to our office by KEREN Cerna for evaluation of head and neck pain.  Patient's symptoms started approximately 1 week ago without injury or inciting event.  She reports severe sharp pain on the right side of her head above the ear.  This episode lasted several hours before it abated.  It has not recurred since.  She now has headache starting at the base of her skull the back of her scalp.  It is constant and does not seem to have any triggers or palliative/provocative factors.  She denies visual disturbance, speech difficulties, dizziness, and lateralizing weakness.  She has not had episodes like this in the past.        PMH:  Past Medical History:   Diagnosis Date    Hyperlipidemia     Hypothyroidism          Current Outpatient Medications:     ciclopirox (Penlac) 8 % solution, Apply  topically to the appropriate area as directed Every Night., Disp: 1 each, Rfl: 1    Docusate Calcium (STOOL SOFTENER PO), Take  by mouth., Disp: , Rfl:     levothyroxine (SYNTHROID, LEVOTHROID) 75 MCG tablet, Take 1 tablet by mouth Daily., Disp: 90 tablet, Rfl: 3    Loratadine (CLARITIN PO), Take 1 tablet by mouth Daily., Disp: , Rfl:     multivitamin (THERAGRAN) tablet tablet, Take  by mouth Daily., Disp: , Rfl:     simvastatin (ZOCOR) 20 MG tablet, Take 1 tablet by mouth Every Night., Disp: 90 tablet, Rfl: 3    VITAMIN D PO, Take  by mouth., Disp: , Rfl:      No Known Allergies     Past Surgical History:   Procedure Laterality Date     SECTION          Family History   Problem Relation Age of Onset    Cancer Mother     Cancer Father     Heart disease Sister     Heart attack Maternal Grandmother          Social Hx:  Social History     Tobacco Use   Smoking Status Never   Smokeless Tobacco Never      Alcohol  "Use: Not At Risk (7/14/2021)    AUDIT-C     Frequency of Alcohol Consumption: Never     Average Number of Drinks: Not on file     Frequency of Binge Drinking: Not on file      Social History     Substance and Sexual Activity   Drug Use Never          Review of Systems   Constitutional:  Positive for activity change.   Eyes:  Positive for visual disturbance (right sided).   Respiratory: Negative.     Cardiovascular: Negative.    Gastrointestinal: Negative.    Endocrine: Negative.    Genitourinary: Negative.    Musculoskeletal:  Positive for arthralgias, myalgias and neck pain (right sided).   Skin: Negative.    Allergic/Immunologic: Negative.    Neurological:  Positive for headaches.   Hematological: Negative.    Psychiatric/Behavioral:  Positive for sleep disturbance.          Objective     /77   Pulse 61   Ht 157.5 cm (62\")   Wt 83.1 kg (183 lb 3.2 oz)   LMP 02/01/2014 (Approximate) Comment: post menopausal   BMI 33.51 kg/m²    Body mass index is 33.51 kg/m².      Physical Exam  Vitals reviewed.   Constitutional:       General: She is not in acute distress.     Appearance: Normal appearance. She is well-developed and well-groomed.   HENT:      Head: Normocephalic and atraumatic.   Eyes:      Extraocular Movements: Extraocular movements intact.      Pupils: Pupils are equal, round, and reactive to light.   Cardiovascular:      Rate and Rhythm: Normal rate and regular rhythm.      Pulses: Normal pulses.   Pulmonary:      Effort: Pulmonary effort is normal. No respiratory distress.   Musculoskeletal:         General: No swelling or tenderness. Normal range of motion.      Cervical back: Normal range of motion.   Skin:     General: Skin is warm and dry.      Findings: No bruising or rash.   Neurological:      General: No focal deficit present.      Mental Status: She is alert and oriented to person, place, and time.      Sensory: Sensation is intact.      Motor: Motor function is intact.      Gait: Gait is " intact.      Deep Tendon Reflexes:      Reflex Scores:       Tricep reflexes are 2+ on the right side and 2+ on the left side.       Bicep reflexes are 2+ on the right side and 2+ on the left side.       Brachioradialis reflexes are 2+ on the right side and 2+ on the left side.     Comments:             Neurological Exam  Mental Status  Alert. Oriented to person, place, and time.    Cranial Nerves  CN III, IV, VI: Extraocular movements intact bilaterally. Pupils equal round and reactive to light bilaterally.    Motor  Normal muscle bulk throughout. No fasciculations present. Normal muscle tone. Strength is 5/5 in all four extremities except as noted.                                             Right                     Left  Deltoid                                   5                          5   Biceps                                   5                          5  Brachioradialis                      5                          5   Triceps                                  5                          5   Wrist extensor                       5                          5    Sensory  Normal sensation.Light touch is normal in upper and lower extremities. Pinprick is normal in upper and lower extremities.     Reflexes  Deep tendon reflexes are 2+ and symmetric except as noted.                                            Right                      Left  Brachioradialis                    2+                         2+  Biceps                                 2+                         2+  Triceps                                2+                         2+    Right pathological reflexes: Xavier's absent.  Left pathological reflexes: Xavier's absent.    Gait   Normal gait.          Results Review  I personally reviewed and interpreted the images from the following studies:          XR Spine Cervical Complete 4 or 5 View    Result Date: 12/5/2023  XR SPINE CERVICAL COMPLETE 4 OR 5 VW Date of Exam: 12/5/2023 7:27 AM EST  Indication: right facial tingling Comparison: None available. Findings: The lateral masses of C1 align normally on C2. The tip of the dens is partially obscured but appears grossly intact. Mild diffuse facet and uncovertebral hypertrophy noted bilaterally. Vertebral body height and alignment is preserved. Mild hypertrophic endplate spurring noted anteriorly at C5-6. Prevertebral soft tissues are within normal limits. Oblique imaging demonstrates suspected mild to moderate neural foraminal narrowing at C6-7 on the left. Limited imaging of the lung apices is unremarkable     Impression: Impression: 1. Height and alignment are preserved 2. Mild multilevel degenerative changes of the cervical spine as above. Electronically Signed: Den Gomez MD  12/5/2023 7:37 AM EST  Workstation ID: [id number]               Assessment & Plan       MDM: Yuliet Mcdowell is a 68 y.o. female who presents with dull headaches over the past week.  This started after initial episode of severe sharp pain on the right side of her head about a week ago.  She does not have any consistent triggers for these headaches and that sharp pain has not returned since.  She has no other distinguishing symptoms or focal neurologic deficits on exam.  The pattern of her headaches sounds akin to occipital neuralgia, though may also just be generalized headaches.  Cervical spine x-ray is relatively unremarkable.    Given the short duration of her symptoms and lack of any other associated distinguishing factors or deficits, I do not believe further workup is indicated at this time.  These are likely generalized headaches, or possibly early presentation of occipital neuralgia.  Would recommend she treat her headaches conservatively with OTC analgesics such as Tylenol as needed.  If these headaches do not go away, if they worsen, or if she develops other neurologic symptoms then she may return at that time as she may warrant an MRI brain at that point.  She may  follow-up with me on an as-needed basis moving forward.       Diagnosis Plan   1. DDD (degenerative disc disease), cervical        2. Occipital neuralgia, unspecified laterality            Return if symptoms worsen or fail to improve.      Yuliet Mcdowell  reports that she has never smoked. She has never used smokeless tobacco.            This patient was examined wearing appropriate personal protective equipment.            Shane Cleaning PA-C    12/13/23  10:13 EST      Part of this note may be an electronic transcription/translation of spoken language to printed text using the Dragon Dictation System.

## 2023-12-12 ENCOUNTER — OFFICE VISIT (OUTPATIENT)
Dept: NEUROSURGERY | Facility: CLINIC | Age: 68
End: 2023-12-12
Payer: COMMERCIAL

## 2023-12-12 VITALS
HEART RATE: 61 BPM | SYSTOLIC BLOOD PRESSURE: 116 MMHG | HEIGHT: 62 IN | BODY MASS INDEX: 33.71 KG/M2 | DIASTOLIC BLOOD PRESSURE: 77 MMHG | WEIGHT: 183.2 LBS

## 2023-12-12 DIAGNOSIS — M54.81 OCCIPITAL NEURALGIA, UNSPECIFIED LATERALITY: ICD-10-CM

## 2023-12-12 DIAGNOSIS — M50.30 DDD (DEGENERATIVE DISC DISEASE), CERVICAL: Primary | ICD-10-CM

## 2023-12-13 ENCOUNTER — PATIENT ROUNDING (BHMG ONLY) (OUTPATIENT)
Dept: NEUROSURGERY | Facility: CLINIC | Age: 68
End: 2023-12-13
Payer: COMMERCIAL

## 2024-01-12 ENCOUNTER — TELEPHONE (OUTPATIENT)
Dept: FAMILY MEDICINE CLINIC | Facility: CLINIC | Age: 69
End: 2024-01-12
Payer: COMMERCIAL

## 2024-01-12 NOTE — TELEPHONE ENCOUNTER
Spoke with patient and she feels symptoms are more mild right now. They are going to take what is recommended and call if they become concerned. I did explain that the paxlovid needs to be taken within 5 days of symptoms and she expressed understanding. She said if they need anything more they will call.

## 2024-01-12 NOTE — TELEPHONE ENCOUNTER
"Caller: Yuliet Mcdowell \"NAOMI\"    Relationship to patient: Self    Best call back number: [phone]    Date of positive COVID19 test: 1/12/2024    COVID19 symptoms: HEAD CONGESTION, BODY ACHES, SCRATCHY FOR 1 DAY    Additional information or concerns: WOULD LIKE TO KNOW IF THERE IS SOMETHING THAT SHE SHOULD TAKE    What is the patients preferred pharmacy  [address] [facility] - [phone] [phone] [facility] [phone]   "

## 2024-01-12 NOTE — TELEPHONE ENCOUNTER
Moderate to severe symptoms can be treated with the antiviral Paxlovid to help reduce severity of symptoms and prevent hospitalization.  If symptoms only mild to moderate, I would recommend alternating Tylenol and Motrin as needed for body aches and fever.  I can send her something in for congestion or she can take Mucinex fast max to help.  Vitamin C and zinc will also be recommended

## 2024-03-01 ENCOUNTER — TELEPHONE (OUTPATIENT)
Dept: FAMILY MEDICINE CLINIC | Facility: CLINIC | Age: 69
End: 2024-03-01
Payer: COMMERCIAL

## 2024-03-01 RX ORDER — AMOXICILLIN 500 MG/1
500 CAPSULE ORAL 2 TIMES DAILY
Qty: 20 CAPSULE | Refills: 0 | Status: SHIPPED | OUTPATIENT
Start: 2024-03-01 | End: 2024-03-11

## 2024-03-01 NOTE — TELEPHONE ENCOUNTER
She stated that she first noticed this when she was laying on her left ear and it started to cause her pain and now it is into her jaw and gland on that side. She feels pain and fullness on that side like the start of a sinus infection. No other symptoms.

## 2024-03-01 NOTE — TELEPHONE ENCOUNTER
"Caller: Yuliet Mcdowell \"NAOMI\"    Relationship: Self    Best call back number: [phone]     What medication are you requesting: ANTIBIOTIC    What are your current symptoms: PAIN AND PRESSURE ON LEFT SIDE OF HEAD    How long have you been experiencing symptoms: 1 DAY    Have you had these symptoms before:    [x] Yes  [] No    Have you been treated for these symptoms before:   [x] Yes  [] No    If a prescription is needed, what is your preferred pharmacy and phone number: [address] [phone] [facility] [phone]      Additional notes: PATIENT IS NOT SURE IF SHE CAN HAVE MEDICATION CALLED IN TO HER PHARMACY OR IF SHE NEEDS APPOINTMENT FIRST.     PLEASE CALL TO ADVISE.         "

## 2024-04-08 ENCOUNTER — TELEPHONE (OUTPATIENT)
Dept: FAMILY MEDICINE CLINIC | Facility: CLINIC | Age: 69
End: 2024-04-08
Payer: COMMERCIAL

## 2024-04-08 RX ORDER — AZITHROMYCIN 250 MG/1
TABLET, FILM COATED ORAL
Qty: 6 TABLET | Refills: 0 | Status: SHIPPED | OUTPATIENT
Start: 2024-04-08

## 2024-04-08 NOTE — TELEPHONE ENCOUNTER
Called pt with providers reponse, pt is agreeable to antibiotics. Would like them sent to [facility] on [address]

## 2024-04-08 NOTE — TELEPHONE ENCOUNTER
Pt called in with concern of chills/body aches, deep chest cough with dark yellow phlegm, fever on and off, stuffy head, runny nose. Pt states symptoms started Friday, has taken home covid test that was negative. Pt wanted same day appt, informed schedule is full, if medication is called in would like it to go to [facility] on [address]. Please advise

## 2024-04-08 NOTE — TELEPHONE ENCOUNTER
Schedule is full at this time but we can call if that changes. With reported sx, would recommend antibiotics for sx consistent with sinusitis/bronchitis. If agreeable, I can send.

## 2024-07-30 ENCOUNTER — TRANSCRIBE ORDERS (OUTPATIENT)
Dept: ADMINISTRATIVE | Facility: HOSPITAL | Age: 69
End: 2024-07-30
Payer: COMMERCIAL

## 2024-07-30 DIAGNOSIS — Z12.31 VISIT FOR SCREENING MAMMOGRAM: Primary | ICD-10-CM

## 2024-08-05 ENCOUNTER — TELEPHONE (OUTPATIENT)
Dept: FAMILY MEDICINE CLINIC | Facility: CLINIC | Age: 69
End: 2024-08-05
Payer: COMMERCIAL

## 2024-08-05 NOTE — TELEPHONE ENCOUNTER
"    Caller: Yuliet Mcdowell \"NAOMI\"    Relationship: Self    Best call back number: [phone]    What orders are you requesting (i.e. lab or imaging):   ANNUAL LAB ORDERS    In what timeframe would the patient need to come in:   PRIOR TO 8.21.24    Where will you receive your lab/imaging services:IN OFFICE    Additional notes:   PLEASE CALL TO SCHEDULE.         "

## 2024-08-06 DIAGNOSIS — E78.2 MIXED HYPERLIPIDEMIA: ICD-10-CM

## 2024-08-06 DIAGNOSIS — E55.9 VITAMIN D DEFICIENCY: Primary | ICD-10-CM

## 2024-08-06 DIAGNOSIS — Z00.00 PREVENTATIVE HEALTH CARE: ICD-10-CM

## 2024-08-06 DIAGNOSIS — E03.9 ACQUIRED HYPOTHYROIDISM: ICD-10-CM

## 2024-08-06 DIAGNOSIS — E53.8 VITAMIN B12 DEFICIENCY: ICD-10-CM

## 2024-08-08 ENCOUNTER — LAB (OUTPATIENT)
Dept: FAMILY MEDICINE CLINIC | Facility: CLINIC | Age: 69
End: 2024-08-08
Payer: COMMERCIAL

## 2024-08-08 ENCOUNTER — HOSPITAL ENCOUNTER (OUTPATIENT)
Dept: MAMMOGRAPHY | Facility: HOSPITAL | Age: 69
Discharge: HOME | End: 2024-08-08
Admitting: NURSE PRACTITIONER
Payer: COMMERCIAL

## 2024-08-08 DIAGNOSIS — Z12.31 VISIT FOR SCREENING MAMMOGRAM: ICD-10-CM

## 2024-08-08 PROCEDURE — 36415 COLL VENOUS BLD VENIPUNCTURE: CPT | Performed by: NURSE PRACTITIONER

## 2024-08-08 PROCEDURE — 80050 GENERAL HEALTH PANEL: CPT | Performed by: NURSE PRACTITIONER

## 2024-08-08 PROCEDURE — 77067 SCR MAMMO BI INCL CAD: CPT

## 2024-08-08 PROCEDURE — 80061 LIPID PANEL: CPT | Performed by: NURSE PRACTITIONER

## 2024-08-08 PROCEDURE — 82607 VITAMIN B-12: CPT | Performed by: NURSE PRACTITIONER

## 2024-08-08 PROCEDURE — 82306 VITAMIN D 25 HYDROXY: CPT | Performed by: NURSE PRACTITIONER

## 2024-08-08 PROCEDURE — 77063 BREAST TOMOSYNTHESIS BI: CPT

## 2024-08-09 LAB
25(OH)D3 SERPL-MCNC: 47.1 NG/ML (ref 30–100)
ALBUMIN SERPL-MCNC: 4.5 G/DL (ref 3.5–5.2)
ALBUMIN/GLOB SERPL: 1.8 G/DL
ALP SERPL-CCNC: 98 U/L (ref 39–117)
ALT SERPL W P-5'-P-CCNC: 27 U/L (ref 1–33)
ANION GAP SERPL CALCULATED.3IONS-SCNC: 10 MMOL/L (ref 5–15)
AST SERPL-CCNC: 28 U/L (ref 1–32)
BILIRUB SERPL-MCNC: 0.6 MG/DL (ref 0–1.2)
BUN SERPL-MCNC: 15 MG/DL (ref 8–23)
BUN/CREAT SERPL: 16.7 (ref 7–25)
CALCIUM SPEC-SCNC: 9.6 MG/DL (ref 8.6–10.5)
CHLORIDE SERPL-SCNC: 103 MMOL/L (ref 98–107)
CHOLEST SERPL-MCNC: 207 MG/DL (ref 0–200)
CO2 SERPL-SCNC: 26 MMOL/L (ref 22–29)
CREAT SERPL-MCNC: 0.9 MG/DL (ref 0.57–1)
DEPRECATED RDW RBC AUTO: 40.2 FL (ref 37–54)
EGFRCR SERPLBLD CKD-EPI 2021: 70.7 ML/MIN/1.73
ERYTHROCYTE [DISTWIDTH] IN BLOOD BY AUTOMATED COUNT: 12.5 % (ref 12.3–15.4)
GLOBULIN UR ELPH-MCNC: 2.5 GM/DL
GLUCOSE SERPL-MCNC: 90 MG/DL (ref 65–99)
HCT VFR BLD AUTO: 40.8 % (ref 34–46.6)
HDLC SERPL-MCNC: 54 MG/DL (ref 40–60)
HGB BLD-MCNC: 13.5 G/DL (ref 12–15.9)
LDLC SERPL CALC-MCNC: 124 MG/DL (ref 0–100)
LDLC/HDLC SERPL: 2.21 {RATIO}
MCH RBC QN AUTO: 29.3 PG (ref 26.6–33)
MCHC RBC AUTO-ENTMCNC: 33.1 G/DL (ref 31.5–35.7)
MCV RBC AUTO: 88.7 FL (ref 79–97)
PLATELET # BLD AUTO: 328 10*3/MM3 (ref 140–450)
PMV BLD AUTO: 10.2 FL (ref 6–12)
POTASSIUM SERPL-SCNC: 4.4 MMOL/L (ref 3.5–5.2)
PROT SERPL-MCNC: 7 G/DL (ref 6–8.5)
RBC # BLD AUTO: 4.6 10*6/MM3 (ref 3.77–5.28)
SODIUM SERPL-SCNC: 139 MMOL/L (ref 136–145)
TRIGL SERPL-MCNC: 167 MG/DL (ref 0–150)
TSH SERPL DL<=0.05 MIU/L-ACNC: 1.75 UIU/ML (ref 0.27–4.2)
VIT B12 BLD-MCNC: 887 PG/ML (ref 211–946)
VLDLC SERPL-MCNC: 29 MG/DL (ref 5–40)
WBC NRBC COR # BLD AUTO: 5.76 10*3/MM3 (ref 3.4–10.8)

## 2024-08-12 RX ORDER — LEVOTHYROXINE SODIUM 0.07 MG/1
75 TABLET ORAL DAILY
Qty: 90 TABLET | Refills: 3 | Status: SHIPPED | OUTPATIENT
Start: 2024-08-12

## 2024-08-21 ENCOUNTER — OFFICE VISIT (OUTPATIENT)
Dept: FAMILY MEDICINE CLINIC | Facility: CLINIC | Age: 69
End: 2024-08-21
Payer: COMMERCIAL

## 2024-08-21 VITALS
HEART RATE: 63 BPM | DIASTOLIC BLOOD PRESSURE: 84 MMHG | OXYGEN SATURATION: 97 % | HEIGHT: 62 IN | WEIGHT: 183 LBS | SYSTOLIC BLOOD PRESSURE: 138 MMHG | BODY MASS INDEX: 33.68 KG/M2

## 2024-08-21 DIAGNOSIS — E78.2 MIXED HYPERLIPIDEMIA: ICD-10-CM

## 2024-08-21 DIAGNOSIS — E03.9 ACQUIRED HYPOTHYROIDISM: ICD-10-CM

## 2024-08-21 DIAGNOSIS — E55.9 VITAMIN D DEFICIENCY: Primary | ICD-10-CM

## 2024-08-21 DIAGNOSIS — M25.511 CHRONIC PAIN OF BOTH SHOULDERS: ICD-10-CM

## 2024-08-21 DIAGNOSIS — Z00.00 ANNUAL PHYSICAL EXAM: ICD-10-CM

## 2024-08-21 DIAGNOSIS — Z78.0 ASYMPTOMATIC MENOPAUSE: ICD-10-CM

## 2024-08-21 DIAGNOSIS — G89.29 CHRONIC PAIN OF BOTH SHOULDERS: ICD-10-CM

## 2024-08-21 DIAGNOSIS — M25.512 CHRONIC PAIN OF BOTH SHOULDERS: ICD-10-CM

## 2024-08-21 DIAGNOSIS — Z13.820 ENCOUNTER FOR SCREENING FOR OSTEOPOROSIS: ICD-10-CM

## 2024-08-21 PROCEDURE — 90677 PCV20 VACCINE IM: CPT | Performed by: NURSE PRACTITIONER

## 2024-08-21 PROCEDURE — 99397 PER PM REEVAL EST PAT 65+ YR: CPT | Performed by: NURSE PRACTITIONER

## 2024-08-21 PROCEDURE — 90471 IMMUNIZATION ADMIN: CPT | Performed by: NURSE PRACTITIONER

## 2024-08-21 RX ORDER — SIMVASTATIN 20 MG
20 TABLET ORAL NIGHTLY
Qty: 90 TABLET | Refills: 3 | Status: SHIPPED | OUTPATIENT
Start: 2024-08-21

## 2024-08-21 NOTE — PROGRESS NOTES
"Chief Complaint  Annual Exam    Subjective        Yuliet Mcdowell presents to [facility]  History of Present Illness    Patient presents for Annual Exam. She is taking Synthroid 75 mcg daily for hypothyroidism.  Patient has hyperlipidemia, taking Zocor 20 mg daily. She denies dizziness, headache, vision changes, chest pain, edema, cough or dyspnea. Patient has no GI or  complaints.  There are no concerns for depression or anxiety.  She does report intermittent joint and muscle pain, particularly to her shoulders.    Advance Care Planning   ACP discussion was held with the patient during this visit. Patient does not have an advance directive, information provided.        PHQ-9 Depression Screening  Little interest or pleasure in doing things? 0-->not at all   Feeling down, depressed, or hopeless? 0-->not at all   Trouble falling or staying asleep, or sleeping too much?     Feeling tired or having little energy?     Poor appetite or overeating?     Feeling bad about yourself - or that you are a failure or have let yourself or your family down?     Trouble concentrating on things, such as reading the newspaper or watching television?     Moving or speaking so slowly that other people could have noticed? Or the opposite - being so fidgety or restless that you have been moving around a lot more than usual?     Thoughts that you would be better off dead, or of hurting yourself in some way?     PHQ-9 Total Score 0   If you checked off any problems, how difficult have these problems made it for you to do your work, take care of things at home, or get along with other people?        Objective   Vital Signs:  /84 (BP Location: Left arm, Patient Position: Sitting, Cuff Size: Adult)   Pulse 63   Ht 157.5 cm (62\")   Wt 83 kg (183 lb)   SpO2 97%   BMI 33.47 kg/m²   Estimated body mass index is 33.47 kg/m² as calculated from the following:    Height as of this encounter: 157.5 cm (62\").    " Weight as of this encounter: 83 kg (183 lb).    BMI is >= 30 and <35. (Class 1 Obesity). The following options were offered after discussion;: exercise counseling/recommendations and nutrition counseling/recommendations      Physical Exam  Constitutional:       Appearance: Normal appearance.   HENT:      Head: Normocephalic.      Right Ear: Tympanic membrane normal.      Left Ear: Tympanic membrane normal.      Mouth/Throat:      Pharynx: Oropharynx is clear.   Cardiovascular:      Rate and Rhythm: Normal rate and regular rhythm.   Pulmonary:      Effort: Pulmonary effort is normal.      Breath sounds: Normal breath sounds.   Abdominal:      General: Abdomen is flat. Bowel sounds are normal.      Palpations: Abdomen is soft.   Musculoskeletal:         General: Normal range of motion.      Cervical back: Neck supple.      Right lower leg: No edema.      Left lower leg: No edema.   Skin:     General: Skin is warm and dry.   Neurological:      Mental Status: She is alert and oriented to person, place, and time.      Gait: Gait is intact.   Psychiatric:         Attention and Perception: Attention normal.         Mood and Affect: Mood normal.         Speech: Speech normal.        Result Review :    CMP          8/8/2024    10:22   CMP   Glucose 90    BUN 15    Creatinine 0.90    EGFR 70.7    Sodium 139    Potassium 4.4    Chloride 103    Calcium 9.6    Total Protein 7.0    Albumin 4.5    Globulin 2.5    Total Bilirubin 0.6    Alkaline Phosphatase 98    AST (SGOT) 28    ALT (SGPT) 27    Albumin/Globulin Ratio 1.8    BUN/Creatinine Ratio 16.7    Anion Gap 10.0      CBC          12/5/2023    07:36 8/8/2024    10:22   CBC   WBC 6.10  5.76    RBC 4.35  4.60    Hemoglobin 12.9  13.5    Hematocrit 38.2  40.8    MCV 87.9  88.7    MCH 29.6  29.3    MCHC 33.7  33.1    RDW 13.2  12.5    Platelets 279  328      Lipid Panel          8/8/2024    10:22   Lipid Panel   Total Cholesterol 207    Triglycerides 167    HDL Cholesterol 54     VLDL Cholesterol 29    LDL Cholesterol  124    LDL/HDL Ratio 2.21      TSH          8/8/2024    10:22   TSH   TSH 1.750                  Assessment and Plan   Diagnoses and all orders for this visit:    1. Vitamin D deficiency (Primary)    2. Annual physical exam  Assessment & Plan:  Mammogram up-to-date  Colon cancer screening up-to-date  Routine vision and dental screenings advised  Well-balanced diet recommended  CDC recommends 150 minutes of exercise weekly  DEXA scan ordered  Prevnar today      3. Acquired hypothyroidism  Assessment & Plan:  Labs reviewed  Continue Synthroid as prescribed      4. Mixed hyperlipidemia  Assessment & Plan:  Labs reviewed  Continue Zocor as prescribed  Recommended healthy heart diet    Orders:  -     simvastatin (ZOCOR) 20 MG tablet; Take 1 tablet by mouth Every Night.  Dispense: 90 tablet; Refill: 3    5. Asymptomatic menopause  -     DEXA Bone Density Axial; Future    6. Encounter for screening for osteoporosis  -     DEXA Bone Density Axial; Future    7. Chronic pain of both shoulders  Assessment & Plan:  Exercises provided, recommended stretching.  Patient may take ibuprofen as needed      Other orders  -     Pneumococcal Conjugate Vaccine 20-Valent (PCV20)           I spent 30 minutes caring for Yuliet on this date of service. This time includes time spent by me in the following activities:preparing for the visit, reviewing tests, obtaining and/or reviewing a separately obtained history, performing a medically appropriate examination and/or evaluation , counseling and educating the patient/family/caregiver, ordering medications, tests, or procedures, documenting information in the medical record, and care coordination  Follow Up   Return in about 1 year (around 8/21/2025) for Annual physical.  Patient was given instructions and counseling regarding her condition or for health maintenance advice. Please see specific information pulled into the AVS if appropriate.      Counseling was given to patient for the following topics: instructions for management, risk factor reductions, prognosis, patient and family education, impressions, risks and benefits of treatment options, and importance of treatment compliance . Total time of the encounter was 25 minutes and 5 minutes was spent counseling.

## 2024-08-21 NOTE — ASSESSMENT & PLAN NOTE
Mammogram up-to-date  Colon cancer screening up-to-date  Routine vision and dental screenings advised  Well-balanced diet recommended  CDC recommends 150 minutes of exercise weekly  DEXA scan ordered  Prevnar today

## 2024-10-04 ENCOUNTER — HOSPITAL ENCOUNTER (OUTPATIENT)
Dept: BONE DENSITY | Facility: HOSPITAL | Age: 69
Discharge: HOME | End: 2024-10-04
Admitting: NURSE PRACTITIONER
Payer: COMMERCIAL

## 2024-10-04 DIAGNOSIS — Z78.0 ASYMPTOMATIC MENOPAUSE: ICD-10-CM

## 2024-10-04 DIAGNOSIS — Z13.820 ENCOUNTER FOR SCREENING FOR OSTEOPOROSIS: ICD-10-CM

## 2024-10-04 PROCEDURE — 77080 DXA BONE DENSITY AXIAL: CPT

## 2024-10-24 ENCOUNTER — FLU SHOT (OUTPATIENT)
Dept: FAMILY MEDICINE CLINIC | Facility: CLINIC | Age: 69
End: 2024-10-24
Payer: COMMERCIAL

## 2024-10-24 DIAGNOSIS — Z23 NEED FOR INFLUENZA VACCINATION: Primary | ICD-10-CM

## 2024-10-24 PROCEDURE — 90662 IIV NO PRSV INCREASED AG IM: CPT | Performed by: NURSE PRACTITIONER

## 2024-10-24 PROCEDURE — 90471 IMMUNIZATION ADMIN: CPT | Performed by: NURSE PRACTITIONER

## 2024-11-14 ENCOUNTER — TELEPHONE (OUTPATIENT)
Dept: FAMILY MEDICINE CLINIC | Facility: CLINIC | Age: 69
End: 2024-11-14
Payer: COMMERCIAL

## 2024-11-14 DIAGNOSIS — M79.644 PAIN IN FINGER OF RIGHT HAND: Primary | ICD-10-CM

## 2024-11-14 NOTE — TELEPHONE ENCOUNTER
Caller: JULIAN APODACA    Relationship to patient: Emergency Contact    Best call back number: [phone]        Patient is needing: PATIENTS  CALLED IN FOR PATIENT. PATIENT WOULD LIKE TO KNOW WHERE TO GO TO GET XRAY ON RIGHT INDEX FINGER THAT SHE HURT LAST.  PLEASE CALL AND ADVISE

## 2024-11-14 NOTE — TELEPHONE ENCOUNTER
Pt's  said she shut her finger in the car door last Friday. It is her right index finger. She has been treating the cut that is on it and that is improving, but now she is having more pain and swelling. They are concerned about a possible fracture and wondered about having an xray ordered or what you would advise.

## 2024-11-18 NOTE — PROGRESS NOTES
Swelling and arthritis seen of the hand without fracture.  Rest, ice at least 3-4 times daily and Aleve or ibuprofen as needed.  If pain and swelling persist, let me know

## 2025-03-10 ENCOUNTER — OFFICE VISIT (OUTPATIENT)
Dept: FAMILY MEDICINE CLINIC | Facility: CLINIC | Age: 70
End: 2025-03-10
Payer: MEDICARE

## 2025-03-10 ENCOUNTER — HOSPITAL ENCOUNTER (OUTPATIENT)
Dept: GENERAL RADIOLOGY | Facility: HOSPITAL | Age: 70
End: 2025-03-10
Payer: MEDICARE

## 2025-03-10 ENCOUNTER — HOSPITAL ENCOUNTER (OUTPATIENT)
Dept: GENERAL RADIOLOGY | Facility: HOSPITAL | Age: 70
Discharge: HOME | End: 2025-03-10
Admitting: NURSE PRACTITIONER
Payer: MEDICARE

## 2025-03-10 ENCOUNTER — LAB (OUTPATIENT)
Dept: FAMILY MEDICINE CLINIC | Facility: CLINIC | Age: 70
End: 2025-03-10
Payer: MEDICARE

## 2025-03-10 VITALS
BODY MASS INDEX: 33.49 KG/M2 | DIASTOLIC BLOOD PRESSURE: 80 MMHG | SYSTOLIC BLOOD PRESSURE: 132 MMHG | OXYGEN SATURATION: 99 % | WEIGHT: 182 LBS | HEIGHT: 62 IN | HEART RATE: 66 BPM

## 2025-03-10 DIAGNOSIS — M25.511 CHRONIC RIGHT SHOULDER PAIN: ICD-10-CM

## 2025-03-10 DIAGNOSIS — L40.9 PSORIASIS: ICD-10-CM

## 2025-03-10 DIAGNOSIS — M54.2 NECK PAIN: Primary | ICD-10-CM

## 2025-03-10 DIAGNOSIS — G89.29 CHRONIC RIGHT SHOULDER PAIN: ICD-10-CM

## 2025-03-10 DIAGNOSIS — M54.2 NECK PAIN: ICD-10-CM

## 2025-03-10 PROCEDURE — 86160 COMPLEMENT ANTIGEN: CPT | Performed by: NURSE PRACTITIONER

## 2025-03-10 PROCEDURE — 36415 COLL VENOUS BLD VENIPUNCTURE: CPT | Performed by: NURSE PRACTITIONER

## 2025-03-10 PROCEDURE — 1160F RVW MEDS BY RX/DR IN RCRD: CPT | Performed by: NURSE PRACTITIONER

## 2025-03-10 PROCEDURE — 99214 OFFICE O/P EST MOD 30 MIN: CPT | Performed by: NURSE PRACTITIONER

## 2025-03-10 PROCEDURE — 72040 X-RAY EXAM NECK SPINE 2-3 VW: CPT

## 2025-03-10 PROCEDURE — 86235 NUCLEAR ANTIGEN ANTIBODY: CPT | Performed by: NURSE PRACTITIONER

## 2025-03-10 PROCEDURE — 1125F AMNT PAIN NOTED PAIN PRSNT: CPT | Performed by: NURSE PRACTITIONER

## 2025-03-10 PROCEDURE — 86431 RHEUMATOID FACTOR QUANT: CPT | Performed by: NURSE PRACTITIONER

## 2025-03-10 PROCEDURE — 86140 C-REACTIVE PROTEIN: CPT | Performed by: NURSE PRACTITIONER

## 2025-03-10 PROCEDURE — 1159F MED LIST DOCD IN RCRD: CPT | Performed by: NURSE PRACTITIONER

## 2025-03-10 PROCEDURE — 86225 DNA ANTIBODY NATIVE: CPT | Performed by: NURSE PRACTITIONER

## 2025-03-10 PROCEDURE — 86038 ANTINUCLEAR ANTIBODIES: CPT | Performed by: NURSE PRACTITIONER

## 2025-03-10 NOTE — PROGRESS NOTES
"Chief Complaint  Neck Pain (Neck pain that radiates into shoulders. Sometimes feels pain down the right arm. Also noticed some numbness in her fingers. Started several months ago )    Subjective        Yuliet Mcdowell presents to [facility]  History of Present Illness    Patient presents with complaint of neck pain that radiates into bilateral shoulders and intermittently down the right arm.  She describes some accompanying numbness/weakness well. Symptoms ongoing for several months, progressing. Patient also reports recent diagnosis of Psoriasis, concerned about autoimmune activity. XR of C-spine in 12/23 showed multilevel degenerative changes.     Objective   Vital Signs:  /80 (BP Location: Left arm, Patient Position: Sitting, Cuff Size: Adult)   Pulse 66   Ht 157.5 cm (62\")   Wt 82.6 kg (182 lb)   SpO2 99%   BMI 33.29 kg/m²   Estimated body mass index is 33.29 kg/m² as calculated from the following:    Height as of this encounter: 157.5 cm (62\").    Weight as of this encounter: 82.6 kg (182 lb).          Physical Exam  Constitutional:       Appearance: Normal appearance.   HENT:      Head: Normocephalic.   Neck:        Comments: Site of pain, mildly tender to palpation  Cardiovascular:      Rate and Rhythm: Normal rate and regular rhythm.   Pulmonary:      Effort: Pulmonary effort is normal.      Breath sounds: Normal breath sounds.   Abdominal:      General: Abdomen is flat. Bowel sounds are normal.      Palpations: Abdomen is soft.   Musculoskeletal:         General: Normal range of motion.      Cervical back: Neck supple. Crepitus present. No edema or signs of trauma. Pain with movement and muscular tenderness present. Normal range of motion.      Right lower leg: No edema.      Left lower leg: No edema.   Skin:     General: Skin is warm and dry.   Neurological:      Mental Status: She is alert and oriented to person, place, and time.      Gait: Gait is intact. "   Psychiatric:         Attention and Perception: Attention normal.         Mood and Affect: Mood normal.         Speech: Speech normal.        Result Review :                Assessment and Plan   Diagnoses and all orders for this visit:    1. Neck pain (Primary)  -     XR Spine Cervical 2 or 3 View; Future    2. Chronic right shoulder pain  -     XR Shoulder 2+ View Right; Future    3. Psoriasis  -     Cancel: DIANE  -     C-reactive Protein  -     Rheumatoid Factor  -     Systemic Lupus Erythematosus (SLE) Profile B    X-rays ordered, discussed potential medication and physical therapy treatment pending result  Labs have been ordered to rule out autoimmune disease       I spent 30 minutes caring for Yuliet on this date of service. This time includes time spent by me in the following activities:preparing for the visit, reviewing tests, obtaining and/or reviewing a separately obtained history, performing a medically appropriate examination and/or evaluation , counseling and educating the patient/family/caregiver, ordering medications, tests, or procedures, documenting information in the medical record, and care coordination  Follow Up   Return for Next scheduled follow up.  Patient was given instructions and counseling regarding her condition or for health maintenance advice. Please see specific information pulled into the AVS if appropriate.

## 2025-03-11 ENCOUNTER — RESULTS FOLLOW-UP (OUTPATIENT)
Dept: FAMILY MEDICINE CLINIC | Facility: CLINIC | Age: 70
End: 2025-03-11
Payer: MEDICARE

## 2025-03-11 LAB
ANA SER QL: NEGATIVE
C3 SERPL-MCNC: 154 MG/DL (ref 82–167)
C4 SERPL-MCNC: 20 MG/DL (ref 12–38)
CHROMATIN AB SERPL-ACNC: <0.2 AI (ref 0–0.9)
CHROMATIN AB SERPL-ACNC: <10 IU/ML (ref 0–14)
CRP SERPL-MCNC: <0.3 MG/DL (ref 0–0.5)
DSDNA AB SER-ACNC: <1 IU/ML (ref 0–9)

## 2025-03-12 ENCOUNTER — HOSPITAL ENCOUNTER (OUTPATIENT)
Dept: GENERAL RADIOLOGY | Facility: HOSPITAL | Age: 70
Discharge: HOME | End: 2025-03-12
Admitting: NURSE PRACTITIONER
Payer: MEDICARE

## 2025-03-12 DIAGNOSIS — M25.511 CHRONIC RIGHT SHOULDER PAIN: ICD-10-CM

## 2025-03-12 DIAGNOSIS — G89.29 CHRONIC RIGHT SHOULDER PAIN: ICD-10-CM

## 2025-03-12 PROCEDURE — 73030 X-RAY EXAM OF SHOULDER: CPT

## 2025-03-13 ENCOUNTER — RESULTS FOLLOW-UP (OUTPATIENT)
Dept: GENERAL RADIOLOGY | Facility: HOSPITAL | Age: 70
End: 2025-03-13
Payer: MEDICARE

## 2025-03-14 DIAGNOSIS — M54.2 NECK PAIN: ICD-10-CM

## 2025-03-14 DIAGNOSIS — M25.511 CHRONIC RIGHT SHOULDER PAIN: Primary | ICD-10-CM

## 2025-03-14 DIAGNOSIS — G89.29 CHRONIC RIGHT SHOULDER PAIN: Primary | ICD-10-CM

## 2025-03-17 ENCOUNTER — RESULTS FOLLOW-UP (OUTPATIENT)
Dept: GENERAL RADIOLOGY | Facility: HOSPITAL | Age: 70
End: 2025-03-17
Payer: MEDICARE

## 2025-03-28 ENCOUNTER — TREATMENT (OUTPATIENT)
Dept: PHYSICAL THERAPY | Facility: CLINIC | Age: 70
End: 2025-03-28
Payer: MEDICARE

## 2025-03-28 DIAGNOSIS — M25.511 CHRONIC RIGHT SHOULDER PAIN: ICD-10-CM

## 2025-03-28 DIAGNOSIS — M54.6 THORACIC SPINE PAIN: ICD-10-CM

## 2025-03-28 DIAGNOSIS — M54.2 PAIN, NECK: Primary | ICD-10-CM

## 2025-03-28 DIAGNOSIS — G89.29 CHRONIC RIGHT SHOULDER PAIN: ICD-10-CM

## 2025-03-28 PROCEDURE — 97112 NEUROMUSCULAR REEDUCATION: CPT | Performed by: PHYSICAL THERAPIST

## 2025-03-28 PROCEDURE — 97110 THERAPEUTIC EXERCISES: CPT | Performed by: PHYSICAL THERAPIST

## 2025-03-28 PROCEDURE — 97162 PT EVAL MOD COMPLEX 30 MIN: CPT | Performed by: PHYSICAL THERAPIST

## 2025-03-28 NOTE — PROGRESS NOTES
Physical Therapy Initial Evaluation and Plan of Care  Office: [address], [place] [address]  P: [phone]  F: [phone]    Patient: Yuliet Mcdowell   : 1955  Diagnosis/ICD-10 Code:  Pain, neck [M54.2]  Referring practitioner: KEREN Butterfield  Date of Initial Visit: 3/28/2025  Today's Date: 3/28/2025  Patient seen for 1 sessions      ICD-10-CM ICD-9-CM   1. Pain, neck  M54.2 723.1   2. Chronic right shoulder pain  M25.511 719.41    G89.29 338.29   3. Thoracic spine pain  M54.6 724.1             Subjective Questionnaire: NDI: 24% impairment      Subjective Evaluation    History of Present Illness  Mechanism of injury: Pt reports she is having pain in neck/upper back and B shoulders and mostly goes down the R arm, sometimes the L. She is having tingling in the R arm as well, mostly in the back of forearm and down under the upper arm in the last few days. Also has pain in the arm pit. Pain in her neck and the back of the R shoulder has been going on for a long time. She remembers mentioning it to her PCP last Aug. Has just progressively gotten worse since then. She also works at desk. Turning head can sometimes cause crunching in the neck which is sometimes painful. Feels it when she's driving, working and doing activities. Did have x-ray done on neck which showed arthritis. Also had one of R shoulder which showed some arthritis too. Can even feel some pain in neck when stepping down going down steps. Has been taking ibuprofen to help pain. Not dropping anything due to weakness. Not really having any consistent headaches. No dizziness. Does have some trouble sleeping because will wake up with pain and have to readjust.     Pain  Current pain ratin  At best pain rating: 3  At worst pain ratin  Relieving factors: change in position, rest and medications  Aggravating factors: keyboarding and prolonged positioning    Diagnostic Tests  Abnormal x-ray: see imaging.    Patient  Goals  Patient goals for therapy: decreased pain, increased motion, return to sport/leisure activities, independence with ADLs/IADLs and increased strength           Past Medical History:   Diagnosis Date    Allergic 2010    History of medical problems Vitamin D deficiency    10/2/2012    Hyperlipidemia     Hypothyroidism         Past Surgical History:   Procedure Laterality Date     SECTION      COLONOSCOPY  2019    few polyps removed        Objective          Static Posture     Shoulders  Depressed and rounded.    Thoracic Spine  Flattened thoracic spine.    Postural Observations  Seated posture: fair  Standing posture: fair    Additional Postural Observation Details  Mod fwd head and shoulders posture     Palpation   Left   No palpable tenderness to the cervical paraspinals and suboccipitals.   Hypertonic in the upper trapezius.   Tenderness of the upper trapezius.     Right   No palpable tenderness to the cervical paraspinals and suboccipitals.   Hypertonic in the upper trapezius. Tenderness of the lower trapezius, middle trapezius and upper trapezius.     Tenderness   Cervical Spine   Tenderness in the spinous process.     Additional Tenderness Details  Tenderness along upper to mid thoracic spine spinous processes, mostly ~T2-T7    Neurological Testing     Sensation   Cervical/Thoracic   Left   Intact: light touch    Right   Intact: light touch    Active Range of Motion   Cervical/Thoracic Spine   Cervical    Flexion: 45 (Pain looking down was worse than ext) degrees with pain  Extension: 25 degrees with pain  Left lateral flexion: 30 (min pain) degrees   Right lateral flexion: 38 (Min pain) degrees   Left rotation: 62 (Min pain) degrees   Right rotation: 59 (Min pain) degrees   Left Shoulder   Normal active range of motion    Right Shoulder   Normal active range of motion    Additional Active Range of Motion Details  WNL, except restricted ~25% with reaching behind back B     Strength/Myotome  Testing     Left Shoulder     Planes of Motion   Flexion: 4   Abduction: 4   External rotation at 0°: 4   Internal rotation at 0°: 4     Isolated Muscles   Lower trapezius: 4-   Middle trapezius: 4-     Right Shoulder     Planes of Motion   Flexion: 4   Abduction: 4   External rotation at 0°: 4   Internal rotation at 0°: 4     Isolated Muscles   Lower trapezius: 4-   Middle trapezius: 4-     Left Elbow   Flexion: 4  Extension: 4    Right Elbow   Flexion: 4  Extension: 4    Tests   Cervical     Left   Negative alar ligament integrity and transverse ligament.     Right   Negative alar ligament integrity and transverse ligament.           Assessment & Plan       Assessment  Impairments: abnormal coordination, abnormal muscle firing, abnormal muscle tone, abnormal or restricted ROM, activity intolerance, impaired physical strength, lacks appropriate home exercise program, pain with function and weight-bearing intolerance   Functional limitations: carrying objects, lifting, sleeping, pulling, pushing, uncomfortable because of pain and reaching overhead   Assessment details: Pt is a 69 year old with c/o cervical spine, thoracic spine and B shoulder shoulder pain with R worse than L. Pt demonstrates limited AROM of cervical spine and thoracic spine. Shoulder AROM is WFL without pain. Pt displays decreased recruitment of DNF and scap stabilizers. Pt demonstrates difficulty with work tasks, ADL's, driving and other daily tasks due to pain. Decreased mobility in spine, most noted along mid-upper thoracic.     Functional limitations are listed above. Pt will benefit from PT in order to address impairments and improve overall function.     Prognosis: good    Goals  Plan Goals: STG (3 weeks):  Pt will demonstrate increased activation of scap stabilizers and DNF during activities/exercises to decrease load on cervical spine.   Pt will display improved ROM of cervical and thoracic spine to WFL with min to no pain to assist with  functional tasks.   Pt will be independent with home exercises to assist with improved strength and continue to improve function.     LTG (6 weeks):  Pt will demonstrate decreased score on the NDI to 10% to demonstrate decreased overall impairment.   Pt will be able to perform work tasks with min to no increased tightness or pain in the cervicothoracic spine for improved function.   Pt will demonstrate improved cervical spine and scapular strength to 4+/5 overall to assist with function.    Pt will display improved ability to drive and turn head without pain for improved safety on the road.   Pt will be able to amb up and down steps without pain to improve overall function.     Plan  Therapy options: will be seen for skilled therapy services  Planned modality interventions: cryotherapy, TENS, thermotherapy (hydrocollator packs) and traction  Planned therapy interventions: ADL retraining, body mechanics training, fine motor coordination training, flexibility, functional ROM exercises, home exercise program, joint mobilization, manual therapy, motor coordination training, neuromuscular re-education, postural training, soft tissue mobilization, spinal/joint mobilization, strengthening, stretching and therapeutic activities  Frequency: 2x week  Duration in weeks: 12  Treatment plan discussed with: patient      HEP:   Access Code: [email or web address]  URL: [email or web address]  Date: 03/28/2025  Prepared by: Linda Dunn    Exercises  - Supine Chin Tuck  - 2 x daily - 10 reps - 5 sec hold  - Seated thoracic rotation  - 2 x daily - 10 reps - 5 sec hold  - Seated Scapular Retraction  - 2 x daily - 10 reps - 5 sec hold  - Seated thoracic extension  - 2 x daily - 10 reps - 5 sec hold  - Standing Radial Nerve Glide  - 2 x daily - 10 reps    History # of Personal Factors and/or Comorbidities: MODERATE (1-2)  Examination of Body System(s): # of elements: MODERATE (3)  Clinical Presentation: EVOLVING  Clinical Decision  Making: MODERATE      Eval:  Low Eval          Mins  44799  Mod Eval     25     Mins  30425  High Eval                            Mins  58662    Timed:         Manual Therapy:         mins  84139;     Therapeutic Exercise:    15     mins  23341;     Neuromuscular Martina:    8    mins  12218;    Therapeutic Activity:          mins  34826;     Gait Training:           mins  30146;     Self Care                            mins   32624    Un-Timed:  Electrical Stimulation:         mins  43706 (MC );  Traction          mins 36530      Timed Treatment:   23   mins   Total Treatment:     48   mins    PT SIGNATURE: Linda Dunn PT, DPT, OCS           [place] License # [id number]              DATE TREATMENT INITIATED: 3/31/2025    Initial Certification  Certification Period: 6/28/2025  I certify that the therapy services are furnished while this patient is under my care.  The services outlined above are required by this patient, and will be reviewed every 90 days.     PHYSICIAN: Brittany Junior APRN      DATE:     Please sign and return via fax to [phone].. Thank you, [facility].

## 2025-03-31 ENCOUNTER — TELEPHONE (OUTPATIENT)
Dept: FAMILY MEDICINE CLINIC | Facility: CLINIC | Age: 70
End: 2025-03-31
Payer: MEDICARE

## 2025-03-31 NOTE — TELEPHONE ENCOUNTER
"  Caller: Yuliet Mcdowell \"NAOMI\"    Relationship: Self    Best call back number: [phone]     What was the call regarding: PATIENT IS REQUESTING TO SPEAK TO RADHA REGARDING PAIN IN NECK AND SHOULDER. SHE CALLED OUT OF WORK TODAY DUE TO THE PAIN AND WANTS TO KNOW IF MEDICATION CAN BE CALLED IN.     [place] [address] [address] [facility] - [phone] [place] [phone] [facility] [phone]     "

## 2025-04-01 ENCOUNTER — TREATMENT (OUTPATIENT)
Dept: PHYSICAL THERAPY | Facility: CLINIC | Age: 70
End: 2025-04-01
Payer: MEDICARE

## 2025-04-01 DIAGNOSIS — M54.12 CERVICAL RADICULOPATHY: ICD-10-CM

## 2025-04-01 DIAGNOSIS — M25.511 CHRONIC RIGHT SHOULDER PAIN: ICD-10-CM

## 2025-04-01 DIAGNOSIS — M54.2 PAIN, NECK: Primary | ICD-10-CM

## 2025-04-01 DIAGNOSIS — M54.2 NECK PAIN: Primary | ICD-10-CM

## 2025-04-01 DIAGNOSIS — G89.29 CHRONIC RIGHT SHOULDER PAIN: ICD-10-CM

## 2025-04-01 DIAGNOSIS — M54.6 THORACIC SPINE PAIN: ICD-10-CM

## 2025-04-01 PROCEDURE — G0283 ELEC STIM OTHER THAN WOUND: HCPCS | Performed by: PHYSICAL THERAPIST

## 2025-04-01 PROCEDURE — 97110 THERAPEUTIC EXERCISES: CPT | Performed by: PHYSICAL THERAPIST

## 2025-04-01 PROCEDURE — 97140 MANUAL THERAPY 1/> REGIONS: CPT | Performed by: PHYSICAL THERAPIST

## 2025-04-01 RX ORDER — HYDROCODONE BITARTRATE AND ACETAMINOPHEN 5; 325 MG/1; MG/1
1 TABLET ORAL EVERY 6 HOURS PRN
Qty: 30 TABLET | Refills: 0 | Status: SHIPPED | OUTPATIENT
Start: 2025-04-01

## 2025-04-01 NOTE — PROGRESS NOTES
Physical Therapy Daily Note  Office: [address], [place] [address]  P: [phone]  F: [phone]    Patient: Yuliet Mcdowell   : 1955  Diagnosis/ICD-10 Code:  Pain, neck [M54.2]  Referring practitioner: KEREN Butterfield  Today's Date: 2025  Patient seen for 2 sessions      ICD-10-CM ICD-9-CM   1. Pain, neck  M54.2 723.1   2. Chronic right shoulder pain  M25.511 719.41    G89.29 338.29   3. Thoracic spine pain  M54.6 724.1                                                                                                                                                                                                                                                                                                                                   VISIT#:  sessions (PN due: 2025/Recert due 2025)     Subjective  Yuliet Mcdowell reports she is having much more pain now than she was. Yesterday morning when she got up, she reached up to pull her hair back and had severe pain in the R armpit and down the R arm. Not sure what she did. She messaged PCP and they have prescribed hydrocodone for her to take after PT appts. She doesn't feel like the pain is from doing her exercises because she felt fine after her first visit here and after doing her exercises at home. She is having pain down the entire R arm still, even into hand. Can't get comfortable in any position.       Objective  Tenderness medial scapular musculature as well as R posterior shoulder/armpit   Difficulty moving the R UE due to pain    See Exercise, Manual, and Modality Logs for complete treatment.     Home Exercises:  [id number]     Assessment/Plan   Pt demonstrates much difficulty with positioning and exercises this date due to pain. Noted increased pain with minimal radial glide so stopped. Able to do chin tucks and pt was able to lie in sidelying for manual treatment with some improvement in symptoms. Ended with  modalities, however pt continued to have pain. Instructed pt only to perform exercises from HEP handout that do not increase pain and hold others for now.       Progress per Plan of Care and Progress strengthening /stabilization /functional activity            Timed:         Manual Therapy:    15     mins  65919;     Therapeutic Exercise:    8     mins  11424;     Neuromuscular Martina:        mins  26602;    Therapeutic Activity:          mins  43958;     Gait Training:           mins  26604;     Ultrasound:          mins  68894;    Ionto                                   mins   38062  Self Care                            mins   72230    Un-Timed:  Electrical Stimulation:    15     mins  83796 ( );  Traction          mins 29402    Timed Treatment:   23   mins   Total Treatment:     38   mins    Linda Dunn, PT, DPT, OCS     [place] License # [id number]       Former smoker

## 2025-04-01 NOTE — TELEPHONE ENCOUNTER
Pain is progressively worsening or she was just in more pain yesterday?  How many sessions of physical therapy has she completed?

## 2025-04-03 ENCOUNTER — TELEPHONE (OUTPATIENT)
Dept: FAMILY MEDICINE CLINIC | Facility: CLINIC | Age: 70
End: 2025-04-03
Payer: MEDICARE

## 2025-04-03 ENCOUNTER — CLINICAL SUPPORT (OUTPATIENT)
Dept: FAMILY MEDICINE CLINIC | Facility: CLINIC | Age: 70
End: 2025-04-03
Payer: MEDICARE

## 2025-04-03 DIAGNOSIS — M25.511 CHRONIC RIGHT SHOULDER PAIN: ICD-10-CM

## 2025-04-03 DIAGNOSIS — M54.2 NECK PAIN: Primary | ICD-10-CM

## 2025-04-03 DIAGNOSIS — M54.12 CERVICAL RADICULOPATHY: ICD-10-CM

## 2025-04-03 DIAGNOSIS — G89.29 CHRONIC RIGHT SHOULDER PAIN: ICD-10-CM

## 2025-04-03 DIAGNOSIS — F40.240 CLAUSTROPHOBIA: ICD-10-CM

## 2025-04-03 PROCEDURE — 96372 THER/PROPH/DIAG INJ SC/IM: CPT | Performed by: NURSE PRACTITIONER

## 2025-04-03 RX ORDER — KETOROLAC TROMETHAMINE 30 MG/ML
30 INJECTION, SOLUTION INTRAMUSCULAR; INTRAVENOUS ONCE
Status: COMPLETED | OUTPATIENT
Start: 2025-04-03 | End: 2025-04-03

## 2025-04-03 RX ORDER — LORAZEPAM 0.5 MG/1
TABLET ORAL
Qty: 2 TABLET | Refills: 0 | Status: SHIPPED | OUTPATIENT
Start: 2025-04-03

## 2025-04-03 RX ADMIN — KETOROLAC TROMETHAMINE 30 MG: 30 INJECTION, SOLUTION INTRAMUSCULAR; INTRAVENOUS at 13:28

## 2025-04-03 NOTE — TELEPHONE ENCOUNTER
Venita called stating that she is still having the neck pain with finger swelling, numbness/tingling. She states that she has been unable to work due to the pain and is not able to get MRI at [facility] til 04/18.     She is wanting to know if she can come in for an injection to help with the pain.

## 2025-04-03 NOTE — TELEPHONE ENCOUNTER
I have placed a referral to pain management.  I sent in 2 doses of Ativan.  Patient to take first dose 1 hour prior to MRI and repeat at time of exam if needed

## 2025-04-03 NOTE — TELEPHONE ENCOUNTER
She can come in for a Toradol injection but that is about the extent that I can offer her in the office.  I also sent in pain medication for her yesterday to take.  I can go ahead and make a referral to pain management in the meantime in the event she needs a spine injection following the MRI.

## 2025-04-03 NOTE — PROGRESS NOTES
Injection  Injection performed in the right ventrogluteal by Maxine Brown MA. Patient tolerated the procedure well without complications.  04/03/25   Maxine Brown MA

## 2025-04-08 ENCOUNTER — TREATMENT (OUTPATIENT)
Dept: PHYSICAL THERAPY | Facility: CLINIC | Age: 70
End: 2025-04-08
Payer: MEDICARE

## 2025-04-08 DIAGNOSIS — M54.2 PAIN, NECK: Primary | ICD-10-CM

## 2025-04-08 DIAGNOSIS — M54.6 THORACIC SPINE PAIN: ICD-10-CM

## 2025-04-08 DIAGNOSIS — G89.29 CHRONIC RIGHT SHOULDER PAIN: ICD-10-CM

## 2025-04-08 DIAGNOSIS — M25.511 CHRONIC RIGHT SHOULDER PAIN: ICD-10-CM

## 2025-04-08 PROCEDURE — 97110 THERAPEUTIC EXERCISES: CPT | Performed by: PHYSICAL THERAPIST

## 2025-04-08 PROCEDURE — 97140 MANUAL THERAPY 1/> REGIONS: CPT | Performed by: PHYSICAL THERAPIST

## 2025-04-08 PROCEDURE — G0283 ELEC STIM OTHER THAN WOUND: HCPCS | Performed by: PHYSICAL THERAPIST

## 2025-04-11 ENCOUNTER — TREATMENT (OUTPATIENT)
Dept: PHYSICAL THERAPY | Facility: CLINIC | Age: 70
End: 2025-04-11
Payer: MEDICARE

## 2025-04-11 ENCOUNTER — TELEPHONE (OUTPATIENT)
Dept: FAMILY MEDICINE CLINIC | Facility: CLINIC | Age: 70
End: 2025-04-11

## 2025-04-11 DIAGNOSIS — M54.2 PAIN, NECK: Primary | ICD-10-CM

## 2025-04-11 DIAGNOSIS — G89.29 CHRONIC RIGHT SHOULDER PAIN: ICD-10-CM

## 2025-04-11 DIAGNOSIS — M25.511 CHRONIC RIGHT SHOULDER PAIN: ICD-10-CM

## 2025-04-11 DIAGNOSIS — M54.6 THORACIC SPINE PAIN: ICD-10-CM

## 2025-04-11 PROCEDURE — 97140 MANUAL THERAPY 1/> REGIONS: CPT | Performed by: PHYSICAL THERAPIST

## 2025-04-11 PROCEDURE — G0283 ELEC STIM OTHER THAN WOUND: HCPCS | Performed by: PHYSICAL THERAPIST

## 2025-04-11 PROCEDURE — 97112 NEUROMUSCULAR REEDUCATION: CPT | Performed by: PHYSICAL THERAPIST

## 2025-04-11 PROCEDURE — 97110 THERAPEUTIC EXERCISES: CPT | Performed by: PHYSICAL THERAPIST

## 2025-04-11 NOTE — PROGRESS NOTES
Physical Therapy Daily Note  Office: [address] [facility] [address], [place] [address]  P: [phone]  F: [phone]    Patient: Yuliet Mcdowell   : 1955  Diagnosis/ICD-10 Code:  Pain, neck [M54.2]  Referring practitioner: KEREN Butterfield  Today's Date: 2025  Patient seen for 3 sessions      ICD-10-CM ICD-9-CM   1. Pain, neck  M54.2 723.1   2. Chronic right shoulder pain  M25.511 719.41    G89.29 338.29   3. Thoracic spine pain  M54.6 724.1                                                                                                                                                                                                                                                                                                                                   VISIT#: 3/24 sessions (PN due: 2025/Recert due 2025)     Subjective  Yuliet Mcdowell reports she is still having pain in the neck and R UE. Hurts all the way down into fingers. Not as severe of pain as last week but still bad. She was able to work this week, but hurts more by the end of the day.     Objective  Tenderness medial scapular musculature as well as R posterior shoulder/armpit   Difficulty moving the R UE due to pain    See Exercise, Manual, and Modality Logs for complete treatment.     Home Exercises:  [id number]     Assessment/Plan   Pt continues to have pain and difficulty tolerating positioning or exercises. Able to do some thoracic exercises without increased pain. Attempted gentle cervical distraction, however increased R UE symptoms so held. Attempted modalities at end of session, however stopped ~10 mins due to increased R UE symptoms again. Instructed pt in exercises to perform at home as well as using the arm rests on chair at her work to prevent over compensation from cervical spine.       Progress per Plan of Care and Progress strengthening /stabilization /functional activity            Timed:         Manual Therapy:     15     mins  53972;     Therapeutic Exercise:    15     mins  33666;     Neuromuscular Martina:    8    mins  21107;    Therapeutic Activity:          mins  61402;     Gait Training:           mins  44355;     Ultrasound:          mins  22732;    Ionto                                   mins   54740  Self Care                            mins   22060    Un-Timed:  Electrical Stimulation:    10     mins  43865 ( );  Traction          mins 75478    Timed Treatment:   38   mins   Total Treatment:     48   mins    Linda Dunn PT, DPT, OCS     [place] License # [id number]

## 2025-04-11 NOTE — TELEPHONE ENCOUNTER
"  Caller: Yuliet Mcdowell \"NAOMI\"    Relationship: Self    Best call back number: [phone]    What is the best time to reach you: ANYTIME    Who are you requesting to speak with (clinical staff, provider,  specific staff member): CLINICAL     What was the call regarding: PATIENT IS CALLING TO GET HER 4/8 MRI RESULTS. PLEASE CALL TO DISCUSS  OKAY TO LEAVE VOICEMAIL     Is it okay if the provider responds through MyChart: NO    "

## 2025-04-11 NOTE — PROGRESS NOTES
Physical Therapy Daily Note  Office: [address] [facility] [address], [place] [address]  P: [phone]  F: [phone]    Patient: Yuliet Mcdowell   : 1955  Diagnosis/ICD-10 Code:  Pain, neck [M54.2]  Referring practitioner: KEREN Butterfield  Today's Date: 2025  Patient seen for 3 sessions      ICD-10-CM ICD-9-CM   1. Pain, neck  M54.2 723.1   2. Chronic right shoulder pain  M25.511 719.41    G89.29 338.29   3. Thoracic spine pain  M54.6 724.1                                                                                                                                                                                                                                                                                                                                   VISIT#: 3/24 sessions (PN due: 2025/Recert due 2025)     Subjective  Yuliet Mcdowell reports she is still having pain in the R side of neck/shoulder and pain down into the arm. Is having swelling in the hand too. She did have MRI done and is waiting for results.       Objective  Tenderness medial scapular musculature as well as R posterior shoulder/armpit   Difficulty moving the R UE due to pain    See Exercise, Manual, and Modality Logs for complete treatment.     Home Exercises:  [id number]     Assessment/Plan   Pt demonstrates difficulty with all exercises still due to increased pain most noted in the R UE. Held cervical distraction this date due to increased pain with positioning as well as with very minimal distraction. Modalities at end of session for pain control.       Progress per Plan of Care and Progress strengthening /stabilization /functional activity            Timed:         Manual Therapy:    15     mins  58446;     Therapeutic Exercise:    8     mins  49286;     Neuromuscular Martina:        mins  57364;    Therapeutic Activity:          mins  49971;     Gait Training:           mins  47047;     Ultrasound:          mins   57314;    Ionto                                   mins   22197  Self Care                            mins   41814    Un-Timed:  Electrical Stimulation:    15     mins  41470 ( );  Traction          mins 15765    Timed Treatment:   23   mins   Total Treatment:     38   mins    Linda Dunn PT, DPT, OCS     [place] License # [id number]

## 2025-04-11 NOTE — TELEPHONE ENCOUNTER
MRI shows Multilevel level degenerative changes - proceed with pain management consult. She can continue physical therapy as well if not causing too much pain

## 2025-04-15 ENCOUNTER — TREATMENT (OUTPATIENT)
Dept: PHYSICAL THERAPY | Facility: CLINIC | Age: 70
End: 2025-04-15
Payer: MEDICARE

## 2025-04-15 DIAGNOSIS — M54.2 PAIN, NECK: Primary | ICD-10-CM

## 2025-04-15 DIAGNOSIS — G89.29 CHRONIC RIGHT SHOULDER PAIN: ICD-10-CM

## 2025-04-15 DIAGNOSIS — M54.6 THORACIC SPINE PAIN: ICD-10-CM

## 2025-04-15 DIAGNOSIS — M25.511 CHRONIC RIGHT SHOULDER PAIN: ICD-10-CM

## 2025-04-15 PROCEDURE — 97110 THERAPEUTIC EXERCISES: CPT | Performed by: PHYSICAL THERAPIST

## 2025-04-15 PROCEDURE — 97140 MANUAL THERAPY 1/> REGIONS: CPT | Performed by: PHYSICAL THERAPIST

## 2025-04-15 PROCEDURE — G0283 ELEC STIM OTHER THAN WOUND: HCPCS | Performed by: PHYSICAL THERAPIST

## 2025-04-15 NOTE — PROGRESS NOTES
Physical Therapy Daily Note  Office: [address] [facility] [address], [place] [address]  P: [phone]  F: [phone]    Patient: Yuliet Mcdowell   : 1955  Diagnosis/ICD-10 Code:  Pain, neck [M54.2]  Referring practitioner: KEREN Butterfield  Today's Date: 4/15/2025  Patient seen for 5 sessions      ICD-10-CM ICD-9-CM   1. Pain, neck  M54.2 723.1   2. Chronic right shoulder pain  M25.511 719.41    G89.29 338.29   3. Thoracic spine pain  M54.6 724.1                                                                                                                                                                                                                                                                                                                                   VISIT#:  sessions (PN due: 2025/Recert due 2025)     Subjective  Yuliet Mcdowell reports she continues to have pain. Still n/t going down the R arm. Feels a little worse today after looking up and down at computer. Has been trying to use the mouse with her L arm.     Objective  Tenderness R lower cerv/upper thoracic     See Exercise, Manual, and Modality Logs for complete treatment.     Home Exercises:  [id number]     Assessment/Plan   Pt had difficulty tolerating sidelying position last visit so did manual treatment in supine which was better this date. Still notes some increase in symptoms with DTM, however feels better on her neck. Pt able to do more exercises this date with less increase in symptoms. Pt instructed to add shoulder flex and chest press at home. Modalities done in sitting this date.       Progress per Plan of Care and Progress strengthening /stabilization /functional activity            Timed:         Manual Therapy:    8     mins  76477;     Therapeutic Exercise:    15     mins  02940;     Neuromuscular Martina:        mins  12976;    Therapeutic Activity:          mins  24618;     Gait Training:           mins  05909;      Ultrasound:          mins  62477;    Ionto                                   mins   55241  Self Care                            mins   98601    Un-Timed:  Electrical Stimulation:    15     mins  52975 ( );  Traction          mins 82027    Timed Treatment:   23   mins   Total Treatment:     38   mins (pt in clinic for ~60 mins total, however only charged for one-on-one care)       Linda Dunn, PT, DPT, OCS     [place] License # [id number]

## 2025-04-16 RX ORDER — GABAPENTIN 300 MG/1
300 CAPSULE ORAL NIGHTLY
Qty: 30 CAPSULE | Refills: 1 | Status: SHIPPED | OUTPATIENT
Start: 2025-04-16

## 2025-04-17 NOTE — PROGRESS NOTES
CHIEF COMPLAINT  Neck/shoulder, arm pain Right      Subjective   Yuliet Mcdowell is a 69 y.o. female who was referred by  Brittany Junior AP*  to our pain management clinic for consultation, evaluation and treatment of Neck pain. Neck pain with radiculopathy started on 3/31/25 when she tried to reach something overhead.  MRI cervical spine has been obtained and reviewed.  She has also tried gabapentin and [place] along with physical therapy. She has been with PT for twice a day. Gabapentin has helped at night time.  She is here with her  today.    Neck pain is 3/10 on VAS, at maximum is 8/10. Pain is dull, achy, sharp, stabbing in nature. Pain is referred to R shoulder, R triceps, R forearm and hand and intermittently to right arm along with numbness and weakness.. The pain is constat. The pain is improved by rest. The pain is worse with increased activities. +numbness and tingling in R forearm, 2nd and 3rd digit.      SOAPP-   [place] back disability scale - 43    PMH:   Psoriasis, vitamin B12 deficiency    Current Medications:   Gabapentin 300 mg qhs     Past Medications:  Norco 5-325 mg #30 tabs - didn't help     Past Modalities:  TENS:       no          Physical Therapy Within The Last 6 Months     Yes- 3/28/2025-current  Psychotherapy     no  Massage Therapy      no    Patient Complains Of:  Uro-Fecal Incontinence no  Weight Gain/Loss  no  Fever/Chills   no  Weakness   no      PEG Assessment   What number best describes your pain on average in the past week?4  What number best describes how, during the past week, pain has interfered with your enjoyment of life?4  What number best describes how, during the past week, pain has interfered with your general activity?  5    PHQ-9 Depression Screening  Little interest or pleasure in doing things? Not at all   Feeling down, depressed, or hopeless? Not at all   PHQ-2 Total Score 0   Trouble falling or staying asleep, or sleeping too much?     Feeling tired or  having little energy?     Poor appetite or overeating?     Feeling bad about yourself - or that you are a failure or have let yourself or your family down?     Trouble concentrating on things, such as reading the newspaper or watching television?     Moving or speaking so slowly that other people could have noticed? Or the opposite - being so fidgety or restless that you have been moving around a lot more than usual?     Thoughts that you would be better off dead, or of hurting yourself in some way?     PHQ-9 Total Score     If you checked off any problems, how difficult have these problems made it for you to do your work, take care of things at home, or get along with other people? Not difficult at all       Patient screened positive for depression based on a PHQ-9 score of  on . Follow-up recommendations include: Suicide Risk Assessment performed.        Current Outpatient Medications:     Cyanocobalamin (VITAMIN B-12 PO), Take  by mouth., Disp: , Rfl:     Docusate Calcium (STOOL SOFTENER PO), Take  by mouth., Disp: , Rfl:     gabapentin (NEURONTIN) 300 MG capsule, Take 1 capsule by mouth Every Night., Disp: 30 capsule, Rfl: 1    HYDROcodone-acetaminophen (NORCO) 5-325 MG per tablet, Take 1 tablet by mouth Every 6 (Six) Hours As Needed for Severe Pain., Disp: 30 tablet, Rfl: 0    levothyroxine (SYNTHROID, LEVOTHROID) 75 MCG tablet, Take 1 tablet by mouth once daily, Disp: 90 tablet, Rfl: 3    Loratadine (CLARITIN PO), Take 1 tablet by mouth Daily., Disp: , Rfl:     LORazepam (Ativan) 0.5 MG tablet, Take 1 dose 1 hour prior to MRI, repeat at time of exam if needed, Disp: 2 tablet, Rfl: 0    multivitamin (THERAGRAN) tablet tablet, Take  by mouth Daily., Disp: , Rfl:     simvastatin (ZOCOR) 20 MG tablet, Take 1 tablet by mouth Every Night., Disp: 90 tablet, Rfl: 3    VITAMIN D PO, Take  by mouth., Disp: , Rfl:     The following portions of the patient's history were reviewed and updated as appropriate: allergies,  current medications, past family history, past medical history, past social history, past surgical history, and problem list.      REVIEW OF PERTINENT MEDICAL DATA    Past Medical History:   Diagnosis Date    Allergic 2010    Arm pain, right     History of medical problems Vitamin D deficiency    10/2/2012    Hyperlipidemia     Hypothyroidism     Neck pain      Past Surgical History:   Procedure Laterality Date     SECTION      COLONOSCOPY  2019    few polyps removed     Family History   Problem Relation Age of Onset    Cancer Mother     Cancer Father     Heart disease Sister     Heart attack Maternal Grandmother      Social History     Socioeconomic History    Marital status:    Tobacco Use    Smoking status: Never     Passive exposure: Never    Smokeless tobacco: Never   Vaping Use    Vaping status: Never Used   Substance and Sexual Activity    Alcohol use: Not Currently    Drug use: Never    Sexual activity: Defer         Review of Systems   Musculoskeletal:  Positive for arthralgias and neck pain.         Vitals:    25 1509   BP: 133/77   Pulse: 67   Resp: 16   SpO2: 98%   Weight: 80.7 kg (178 lb)   PainSc: 2          Objective   Physical Exam  Musculoskeletal:         General: Tenderness present.        Arms:            Imaging Reviewed:  MRI cervical spine without contrast-2025  C5-C6-minimal disc bulge with mild central stenosis  C6-7-large right paracentral protrusion with severe central stenosis and narrowing of medial lateral recess and right neural foramina.  Moderate narrowing of left neuroforaminal  C7-T1-no spinal canal stenosis.    Cervical x-ray-3/10/2025  - Degenerative findings most pronounced at C5-C6  - Multilevel facet arthritis    Right shoulder x-ray-3/10/2025  - Degenerative changes most notable at the AC joint of right shoulder.  - Mild irregularities at the greater tuberosity suggesting rotator cuff arthropathy  - Mild arthritis in the glenohumeral  joint.    Assessment:    1. Cervical radiculopathy    2. Cervical spondylosis         Plan:   1. New patient visit, urine drug screen per office policy. UDS today to monitor for compliance with medication use. The UDS is medically necessary to monitor for compliance due to the potential side effects and complications of misuse of opioids. UDS done today will review on next visit.     2. We discussed trying a course of formal physical therapy.  Physical therapy can help strengthen and stretch the muscles around the joints. Continue to be as active as possible.  Patient is currently in PT.  3.  Continue gabapentin.  4. Patient has pain in the neck with radicular pain in the arm, patient has failed conservative therapy including PT and pharmacological management for more than 6 weeks and pain interferes with activities of daily living. Spurling’s test is positive. MRI shows large protrusion and severe central canal stenosis at C6-7.  Discussed cervical LEVAR C7-T1 (R). Discussed the possibility of infection, bleeding, nerve damage, post dural puncture headache, increased pain, paraplegia. Patient understands and agrees.       RTC for injection and then 3 week follow up.     Bradley Cantor DO  Pain Management   [facility]         INSPECT REPORT    As part of the patient's treatment plan, I may be prescribing controlled substances. The patient has been made aware of appropriate use of such medications, including potential risk of somnolence, limited ability to drive and/or work safely, and the potential for dependence or overdose. It has also been made clear that these medications are for use by this patient only, without concomitant use of alcohol or other substances unless prescribed.     Patient has completed prescribing agreement detailing terms of continued prescribing of controlled substances, including monitoring INSPECT reports, urine drug screening, and pill counts if necessary. The patient is aware that  inappropriate use will results in cessation of prescribing such medications.    INSPECT report has been reviewed and scanned into the patient's chart.      EMR Dragon/Transcription Disclaimer:   Much of this encounter note is an electronic transcription/translation of spoken language to printed text. The electronic translation of spoken language may permit erroneous, or at times, nonsensical words or phrases to be inadvertently transcribed; Although I have reviewed the note for such errors, some may still exist.

## 2025-04-18 ENCOUNTER — TREATMENT (OUTPATIENT)
Dept: PHYSICAL THERAPY | Facility: CLINIC | Age: 70
End: 2025-04-18
Payer: MEDICARE

## 2025-04-18 DIAGNOSIS — M54.6 THORACIC SPINE PAIN: ICD-10-CM

## 2025-04-18 DIAGNOSIS — G89.29 CHRONIC RIGHT SHOULDER PAIN: ICD-10-CM

## 2025-04-18 DIAGNOSIS — M54.2 PAIN, NECK: Primary | ICD-10-CM

## 2025-04-18 DIAGNOSIS — M25.511 CHRONIC RIGHT SHOULDER PAIN: ICD-10-CM

## 2025-04-18 PROCEDURE — 97110 THERAPEUTIC EXERCISES: CPT | Performed by: PHYSICAL THERAPIST

## 2025-04-18 PROCEDURE — 97530 THERAPEUTIC ACTIVITIES: CPT | Performed by: PHYSICAL THERAPIST

## 2025-04-18 PROCEDURE — G0283 ELEC STIM OTHER THAN WOUND: HCPCS | Performed by: PHYSICAL THERAPIST

## 2025-04-18 NOTE — PROGRESS NOTES
Physical Therapy Daily Treatment Note  [facility] [place] [address]  [place]. [address]  P: [phone] F: [phone]    Patient: Yuliet Mcdowell   : 1955  Referring practitioner: KEREN Butterfield  Date of Initial Visit: Type: THERAPY  Noted: 3/28/2025  Today's Date: 2025  Patient seen for 6 sessions       Visit Diagnoses:    ICD-10-CM ICD-9-CM   1. Pain, neck  M54.2 723.1   2. Chronic right shoulder pain  M25.511 719.41    G89.29 338.29   3. Thoracic spine pain  M54.6 724.1         Subjective:  Yuliet Mcdowell reports: continued N/T in Rt arm that will go down into her Rt index finger.  Pt states she started Gabapentin two days ago and she goes to pain management on Monday for a consult.       Objective   See Exercise, Manual, and Modality Logs for complete treatment. Started with Nustep followed by mat exs.  Added in levator and upper trap stretch today.       Assessment/Plan:   Pt with RUE pain throughout session, some stretches cause it to worsen.  Continuing to stretch c/s as well as shoulder. Will continue to progress as pt is able to tolerate and continue with e-stim and MH to help with pain reduction.           Timed:         Manual Therapy:         mins  94335;     Therapeutic Exercise:    15     mins  88473;     Neuromuscular Martina:        mins  76225;    Therapeutic Activity:     12     mins  59833;     Gait Training:           mins  26713;     Ultrasound:          mins  57336;    Self Care                            mins  92516      Un-Timed:  Electrical Stimulation:    15     mins  59521 ( );  Traction          mins 91826  Moist Heat Samuel:           15 min       Timed Treatment:   42   mins   Total Treatment:      49  mins    Hermila Kunz PTA  [place] License #: [id number]    Physical Therapist Assistant

## 2025-04-21 ENCOUNTER — OFFICE VISIT (OUTPATIENT)
Dept: PAIN MEDICINE | Facility: CLINIC | Age: 70
End: 2025-04-21
Payer: MEDICARE

## 2025-04-21 VITALS
SYSTOLIC BLOOD PRESSURE: 133 MMHG | BODY MASS INDEX: 32.56 KG/M2 | WEIGHT: 178 LBS | HEART RATE: 67 BPM | DIASTOLIC BLOOD PRESSURE: 77 MMHG | RESPIRATION RATE: 16 BRPM | OXYGEN SATURATION: 98 %

## 2025-04-21 DIAGNOSIS — M54.12 CERVICAL RADICULOPATHY: Primary | ICD-10-CM

## 2025-04-21 DIAGNOSIS — M47.812 CERVICAL SPONDYLOSIS: ICD-10-CM

## 2025-04-21 PROCEDURE — 1160F RVW MEDS BY RX/DR IN RCRD: CPT | Performed by: STUDENT IN AN ORGANIZED HEALTH CARE EDUCATION/TRAINING PROGRAM

## 2025-04-21 PROCEDURE — 99204 OFFICE O/P NEW MOD 45 MIN: CPT | Performed by: STUDENT IN AN ORGANIZED HEALTH CARE EDUCATION/TRAINING PROGRAM

## 2025-04-21 PROCEDURE — 1125F AMNT PAIN NOTED PAIN PRSNT: CPT | Performed by: STUDENT IN AN ORGANIZED HEALTH CARE EDUCATION/TRAINING PROGRAM

## 2025-04-21 PROCEDURE — G2211 COMPLEX E/M VISIT ADD ON: HCPCS | Performed by: STUDENT IN AN ORGANIZED HEALTH CARE EDUCATION/TRAINING PROGRAM

## 2025-04-21 PROCEDURE — 1159F MED LIST DOCD IN RCRD: CPT | Performed by: STUDENT IN AN ORGANIZED HEALTH CARE EDUCATION/TRAINING PROGRAM

## 2025-04-22 ENCOUNTER — PATIENT ROUNDING (BHMG ONLY) (OUTPATIENT)
Dept: PAIN MEDICINE | Facility: CLINIC | Age: 70
End: 2025-04-22
Payer: MEDICARE

## 2025-04-22 NOTE — PROGRESS NOTES
April 22, 2025    Hello, may I speak with Yuliet Mcdowell?    My name is Ladan Cespedes      I am  with [facility]  [address] [place] [address].    Before we get started may I verify your date of birth? 1955    I am calling to officially welcome you to our practice and ask about your recent visit. Is this a good time to talk? yes    Tell me about your visit with us. What things went well?  Left voice mail       We're always looking for ways to make our patients' experiences even better. Do you have recommendations on ways we may improve?  no    Overall were you satisfied with your first visit to our practice? yes       I appreciate you taking the time to speak with me today. Is there anything else I can do for you? no      Thank you, and have a great day.

## 2025-04-25 ENCOUNTER — TREATMENT (OUTPATIENT)
Dept: PHYSICAL THERAPY | Facility: CLINIC | Age: 70
End: 2025-04-25
Payer: MEDICARE

## 2025-04-25 DIAGNOSIS — G89.29 CHRONIC RIGHT SHOULDER PAIN: ICD-10-CM

## 2025-04-25 DIAGNOSIS — M54.2 PAIN, NECK: Primary | ICD-10-CM

## 2025-04-25 DIAGNOSIS — M25.511 CHRONIC RIGHT SHOULDER PAIN: ICD-10-CM

## 2025-04-25 DIAGNOSIS — M54.6 THORACIC SPINE PAIN: ICD-10-CM

## 2025-04-25 PROCEDURE — 97140 MANUAL THERAPY 1/> REGIONS: CPT | Performed by: PHYSICAL THERAPIST

## 2025-04-25 PROCEDURE — G0283 ELEC STIM OTHER THAN WOUND: HCPCS | Performed by: PHYSICAL THERAPIST

## 2025-04-25 PROCEDURE — 97110 THERAPEUTIC EXERCISES: CPT | Performed by: PHYSICAL THERAPIST

## 2025-04-25 PROCEDURE — 97112 NEUROMUSCULAR REEDUCATION: CPT | Performed by: PHYSICAL THERAPIST

## 2025-04-25 NOTE — PROGRESS NOTES
Re-Assessment/Progress Note  Office: [address] [facility] [address], [place] [address]  P: [phone]   F: [phone]        Patient: Yuliet Mcdowell   : 1955  Diagnosis/ICD-10 Code:  Pain, neck [M54.2]  Referring practitioner: KEREN Butterfield  Date of Initial Visit: Type: THERAPY  Noted: 3/28/2025  Today's Date: 2025  Patient seen for 7 sessions      ICD-10-CM ICD-9-CM   1. Pain, neck  M54.2 723.1   2. Chronic right shoulder pain  M25.511 719.41    G89.29 338.29   3. Thoracic spine pain  M54.6 724.1       Subjective:   Yuliet Mcdowell reports her pain is doing better. She started taking gabapentin and that has really been helping. She is still having pain and numbness in the forearm and hand but not the entire arm like she was. She can sleep better and has even been able to lie on both sides some. She has been carrying light-mod weighted boxes at work this week because they are moving things and it has been fine. Has injection scheduled for next week.     Pain: Current: 3/10, Best: 2/10, Worst: 7/10    Subjective Questionnaire: NDI:22% impairment   Clinical Progress: improved  Home Program Compliance: Yes  Treatment has included: therapeutic exercise, neuromuscular re-education, manual therapy, therapeutic activity, electrical stimulation, and moist heat    Objective          Postural Observations  Seated posture: good  Standing posture: good      Palpation     Right   Hypertonic in the cervical paraspinals and rhomboids. Tenderness of the cervical paraspinals and rhomboids.     Tenderness   Cervical Spine   Tenderness in the facet joint.     Additional Tenderness Details  Tenderness ~T1-2 on the R     Neurological Testing     Sensation   Cervical/Thoracic   Left   Intact: light touch    Right   Diminished: light touch  Paresthesia: light touch    Active Range of Motion   Cervical/Thoracic Spine   Cervical    Flexion: 50 degrees with pain  Extension: 32 (Min pain) degrees with pain  Left lateral  flexion: 36 (Pain midline neck to R shoulder) degrees with pain  Right lateral flexion: 38 (Min pain) degrees with pain  Left rotation: 51 degrees with pain  Right rotation: 54 (Pain radiating down R arm) degrees with pain  Left Shoulder   Normal active range of motion    Right Shoulder   Normal active range of motion    Strength/Myotome Testing     Additional Strength Details  Did not assess this date       Assessment & Plan       Assessment  Impairments: abnormal coordination, abnormal muscle firing, abnormal muscle tone, abnormal or restricted ROM, activity intolerance, impaired physical strength, lacks appropriate home exercise program, pain with function and weight-bearing intolerance   Functional limitations: carrying objects, lifting, sleeping, pulling, pushing, uncomfortable because of pain and reaching overhead   Assessment details: Pt is a 69 year old with c/o cervical spine pain with paresthesia and pain in the R UE. Pt demonstrates improvement in AROM of the cervical spine in most motions, however slightly less with rotation this date. Pt demonstrates increased mobility of the R UE with less pain in the arm. Continues to have paresthesia in the hand, however little less swelling in the R hand. Is sleeping better at night. Still difficulty using the R UE, however has been able to lift light weight recently without increased pain.     Functional limitations are listed above. Pt will benefit from PT in order to address impairments and improve overall function.     Prognosis: good    Goals  Plan Goals: STG (3 weeks):  Pt will demonstrate increased activation of scap stabilizers and DNF during activities/exercises to decrease load on cervical spine. - met   Pt will display improved ROM of cervical and thoracic spine to WFL with min to no pain to assist with functional tasks. - progressing   Pt will be independent with home exercises to assist with improved strength and continue to improve function. - met for  current program      LTG (6 weeks):  Pt will demonstrate decreased score on the NDI to 10% to demonstrate decreased overall impairment. - not met  Pt will be able to perform work tasks with min to no increased tightness or pain in the cervicothoracic spine for improved function. - progressing  Pt will demonstrate improved cervical spine and scapular strength to 4+/5 overall to assist with function.  - progressing  Pt will display improved ability to drive and turn head without pain for improved safety on the road. - progressing  Pt will be able to amb up and down steps without pain to improve overall function. - progressing    Plan  Therapy options: will be seen for skilled therapy services  Planned modality interventions: cryotherapy, TENS, thermotherapy (hydrocollator packs) and traction  Planned therapy interventions: ADL retraining, body mechanics training, fine motor coordination training, flexibility, functional ROM exercises, home exercise program, joint mobilization, manual therapy, motor coordination training, neuromuscular re-education, postural training, soft tissue mobilization, spinal/joint mobilization, strengthening, stretching and therapeutic activities  Frequency: 2x week  Duration in weeks: 8  Treatment plan discussed with: patient      Progress toward previous goals: Partially Met        Recommendations: Continue as planned  Timeframe: 2 months  Prognosis to achieve goals: good          Timed:         Manual Therapy:    8     mins  59267;     Therapeutic Exercise:    15     mins  69324;     Neuromuscular Martina:    15    mins  61819;    Therapeutic Activity:          mins  70037;     Gait Training:           mins  96463;     Ultrasound:          mins  21352;    Ionto                                   mins   18466  Self Care                            mins   18789    Un-Timed:  Electrical Stimulation:    15     mins  56749 ( );  Traction          mins 86399  Re-Eval                                mins  15554      Timed Treatment:   38   mins   Total Treatment:     53   mins      PT Signature: Linda Dunn, PT, DPT, OCS     [place] License # [id number]

## 2025-05-02 ENCOUNTER — TREATMENT (OUTPATIENT)
Dept: PHYSICAL THERAPY | Facility: CLINIC | Age: 70
End: 2025-05-02
Payer: MEDICARE

## 2025-05-02 NOTE — PROGRESS NOTES
Physical Therapy Daily Treatment Note  [facility] [place]  [address]  [place]. [address]  P: [phone] F: [phone]    Patient: Yuliet Mcdowell   : 1955  Referring practitioner: KEREN Butterfield  Date of Initial Visit: No linked episodes  Today's Date: 2025  Patient seen for Visit count could not be calculated. Make sure you are using a visit which is associated with an episode. sessions       Visit Diagnoses:  No diagnosis found.      Subjective:  Yuliet Mcdowell reports: continued relief possibly from Gabapentin with N/T in her Rt arm.  Pt goes for an injection with pain management next Wed.  Reports pain currently only 1-2/10.  Pt very hopeful about injection next week to give her some additional relief.       Objective   See Exercise, Manual, and Modality Logs for complete treatment. Started with Nustep followed by supine and seated there ex.  Performed manual therapy:  passive bilateral UT stretch with overpressure applied to shoulder  3x15 sec ea.  Performed subocciput release  4x25 sec with light STM applied to base of skull between trials.  Ended manual therapy with STM to Rt side of c/s .   Ended tx with pre-mod estim and MH application with pt in seated pos x 15 min.       Assessment/Plan:  Pt tolerating tx better since not having as much N/T in RUE. Pt appears to be progressing with therapy and the medication pt is taking. Will continue as pt is able to tolerate to help further relieve pain and improve function. Pt reports feeling good post tx.  No reports of any increased pain.              Timed:         Manual Therapy:       12  mins  82388;     Therapeutic Exercise:    15     mins  75364;     Neuromuscular Martina:        mins  77387;    Therapeutic Activity:     18     mins  50221;     Gait Training:           mins  90808;     Ultrasound:          mins  95487;    Self Care                            mins  78455      Un-Timed:  Electrical  Stimulation:    15     mins  65754 ( );  Traction          mins 41096  Heat:                          15 min         Timed Treatment:   45   mins   Total Treatment:     60   mins    Hermila Kunz PTA  [place] License #: [id number]    Physical Therapist Assistant

## 2025-05-06 ENCOUNTER — TREATMENT (OUTPATIENT)
Dept: PHYSICAL THERAPY | Facility: CLINIC | Age: 70
End: 2025-05-06
Payer: MEDICARE

## 2025-05-06 DIAGNOSIS — M54.2 PAIN, NECK: Primary | ICD-10-CM

## 2025-05-06 DIAGNOSIS — M54.6 THORACIC SPINE PAIN: ICD-10-CM

## 2025-05-06 DIAGNOSIS — M25.511 CHRONIC RIGHT SHOULDER PAIN: ICD-10-CM

## 2025-05-06 DIAGNOSIS — G89.29 CHRONIC RIGHT SHOULDER PAIN: ICD-10-CM

## 2025-05-06 PROCEDURE — 97110 THERAPEUTIC EXERCISES: CPT | Performed by: PHYSICAL THERAPIST

## 2025-05-06 PROCEDURE — G0283 ELEC STIM OTHER THAN WOUND: HCPCS | Performed by: PHYSICAL THERAPIST

## 2025-05-06 PROCEDURE — 97140 MANUAL THERAPY 1/> REGIONS: CPT | Performed by: PHYSICAL THERAPIST

## 2025-05-06 NOTE — PROGRESS NOTES
Physical Therapy Daily Note  Office: [address] [facility] [address], [place] [address]  P: [phone]  F: [phone]    Patient: Yuliet Mcdowell   : 1955  Diagnosis/ICD-10 Code:  Pain, neck [M54.2]  Referring practitioner: KEREN Butterfield  Today's Date: 2025  Patient seen for 8 sessions      ICD-10-CM ICD-9-CM   1. Pain, neck  M54.2 723.1   2. Chronic right shoulder pain  M25.511 719.41    G89.29 338.29   3. Thoracic spine pain  M54.6 724.1                                                                                                                                                                                                                                                                                                                                   VISIT#:  sessions (PN due: 2025/Recert due 2025)     Subjective  Yuliet Mcdowell reports she is doing better. Only having some pain and n/t mostly in her R hand now. Not radiating as much.    Objective  Tenderness R lower cerv/upper thoracic     See Exercise, Manual, and Modality Logs for complete treatment.     Home Exercises:  [id number]     Assessment/Plan   Pt demonstrates improvement with mobility of the cervical spine as well as surrounding musculature. Doing well with exercises and able to do thoracic rotation again without increased pain. Modalities at end of session for pain control.       Progress per Plan of Care and Progress strengthening /stabilization /functional activity            Timed:         Manual Therapy:    8     mins  48649;     Therapeutic Exercise:    15     mins  10566;     Neuromuscular Martina:        mins  21971;    Therapeutic Activity:          mins  91110;     Gait Training:           mins  03277;     Ultrasound:          mins  54945;    Ionto                                   mins   80031  Self Care                            mins   41910    Un-Timed:  Electrical Stimulation:    15     mins  66391 (  );  Traction          mins 39856    Timed Treatment:   23   mins   Total Treatment:     38   mins (pt in clinic for ~60 mins total, however only charged for one-on-one care)       Linda Dunn, PT, DPT, OCS     [place] License # [id number]

## 2025-05-07 ENCOUNTER — HOSPITAL ENCOUNTER (OUTPATIENT)
Dept: PAIN MEDICINE | Facility: HOSPITAL | Age: 70
Discharge: HOME | End: 2025-05-07
Payer: MEDICARE

## 2025-05-07 VITALS
DIASTOLIC BLOOD PRESSURE: 93 MMHG | SYSTOLIC BLOOD PRESSURE: 134 MMHG | HEIGHT: 62 IN | BODY MASS INDEX: 32.76 KG/M2 | HEART RATE: 69 BPM | TEMPERATURE: 97.1 F | WEIGHT: 178 LBS | OXYGEN SATURATION: 95 % | RESPIRATION RATE: 16 BRPM

## 2025-05-07 DIAGNOSIS — R52 PAIN: ICD-10-CM

## 2025-05-07 DIAGNOSIS — M54.12 CERVICAL RADICULOPATHY: Primary | ICD-10-CM

## 2025-05-07 PROCEDURE — 25510000001 IOPAMIDOL 41 % SOLUTION: Performed by: STUDENT IN AN ORGANIZED HEALTH CARE EDUCATION/TRAINING PROGRAM

## 2025-05-07 PROCEDURE — 77003 FLUOROGUIDE FOR SPINE INJECT: CPT

## 2025-05-07 PROCEDURE — 25010000002 DEXAMETHASONE SODIUM PHOSPHATE 10 MG/ML SOLUTION: Performed by: STUDENT IN AN ORGANIZED HEALTH CARE EDUCATION/TRAINING PROGRAM

## 2025-05-07 RX ORDER — DEXAMETHASONE SODIUM PHOSPHATE 10 MG/ML
10 INJECTION, SOLUTION INTRAMUSCULAR; INTRAVENOUS ONCE
Status: COMPLETED | OUTPATIENT
Start: 2025-05-07 | End: 2025-05-07

## 2025-05-07 RX ORDER — IOPAMIDOL 408 MG/ML
3 INJECTION, SOLUTION INTRATHECAL
Status: COMPLETED | OUTPATIENT
Start: 2025-05-07 | End: 2025-05-07

## 2025-05-07 RX ADMIN — DEXAMETHASONE SODIUM PHOSPHATE 10 MG: 10 INJECTION, SOLUTION INTRAMUSCULAR; INTRAVENOUS at 11:49

## 2025-05-07 RX ADMIN — IOPAMIDOL 1 ML: 408 INJECTION, SOLUTION INTRATHECAL at 11:49

## 2025-05-07 NOTE — H&P
H and P reviewed from previous visit and no changes to patient's clinical presentation. Will proceed with procedure as planned. Patient denies history of DM and being on blood thinners.    Bradley Cantor DO  Pain Management   [facility]

## 2025-05-07 NOTE — ADDENDUM NOTE
Encounter addended by: Shalini Saunders RN on: 5/7/2025 11:56 AM   Actions taken: Flowsheet accepted

## 2025-05-07 NOTE — DISCHARGE INSTRUCTIONS

## 2025-05-07 NOTE — ADDENDUM NOTE
Encounter addended by: Shalini Saunders RN on: 5/7/2025 12:08 PM   Actions taken: Flowsheet accepted

## 2025-05-07 NOTE — PROCEDURES
PREOPERATIVE DIAGNOSIS:    1. Cervical radiculopathy     POSTOPERATIVE DIAGNOSIS:  Same.    PROCEDURE PERFORMED: Cervical LEVAR C 7-T1 (R)     PROCEDURE IN DETAIL:    Written informed consent was taken from the patient. Pre-procedure blood pressure and pulse were stable and recorded in patients clinic chart. The patient was brought to the procedure room and placed in the prone position on fluoroscopy table. The patient’s neck was prepped with chloraprep and draped in the usual sterile fashion.   The skin over the C 7-T1 space was identified under fluoroscopic guidance and infiltrated with 1% lidocaine for local anesthesia via 25 gauge needle.  A 17 gauge Tuohy needle was inserted through the skin under fluoroscopic guidance until we got to the epidural space with loss of resistance technique.  Negative for CSF and heme.  2 ml of omnipaque contrast was injected under continuous fluoroscopic guidance and good spread was noted from C5-7 space bilaterally. 10 mg of dexamethasone mixed with 4 ml of preservative free normal saline was injected with minimal pressure. The needle was cleared with preservative free normal saline and removed. Band aid was applied.  Following the procedure the patient's vital signs were stable. The patient was discharged home in good condition after being given discharge instructions.    COMPLICATIONS : None    Bradley Cantor DO  Pain Management   [facility]

## 2025-05-08 ENCOUNTER — TELEPHONE (OUTPATIENT)
Dept: PAIN MEDICINE | Facility: HOSPITAL | Age: 70
End: 2025-05-08
Payer: MEDICARE

## 2025-05-09 ENCOUNTER — TREATMENT (OUTPATIENT)
Dept: PHYSICAL THERAPY | Facility: CLINIC | Age: 70
End: 2025-05-09
Payer: MEDICARE

## 2025-05-09 DIAGNOSIS — G89.29 CHRONIC RIGHT SHOULDER PAIN: ICD-10-CM

## 2025-05-09 DIAGNOSIS — M25.511 CHRONIC RIGHT SHOULDER PAIN: ICD-10-CM

## 2025-05-09 DIAGNOSIS — M54.2 PAIN, NECK: Primary | ICD-10-CM

## 2025-05-09 DIAGNOSIS — M54.6 THORACIC SPINE PAIN: ICD-10-CM

## 2025-05-09 PROCEDURE — 97530 THERAPEUTIC ACTIVITIES: CPT | Performed by: PHYSICAL THERAPIST

## 2025-05-09 PROCEDURE — 97140 MANUAL THERAPY 1/> REGIONS: CPT | Performed by: PHYSICAL THERAPIST

## 2025-05-09 NOTE — PROGRESS NOTES
Physical Therapy Daily Treatment Note  [facility] Therapy [place] [address]  [place]. [address]  P: [phone] F: [phone]    Patient: Yuliet Mcdowell   : 1955  Referring practitioner: KEREN Butterfield  Date of Initial Visit: Type: THERAPY  Noted: 3/28/2025  Today's Date: 2025  Patient seen for 10 sessions       Visit Diagnoses:    ICD-10-CM ICD-9-CM   1. Pain, neck  M54.2 723.1   2. Chronic right shoulder pain  M25.511 719.41    G89.29 338.29   3. Thoracic spine pain  M54.6 724.1         Subjective:  Yuliet Mcdowell reports: had injection on Wed.  No pain reported at this time in her neck and shoulders. Continued N/T in the first two digits of Rt hand.       Objective   See Exercise, Manual, and Modality Logs for complete treatment.  Started with seated there ex.  Continuing to focus on c/s and shoulder stretching to try and alleviate any N/T that is radiating into RUE.  Applied MH at the end of tx but did not apply e-stim as pt is not having any pain at this time.       Assessment:  Pt currently with no pain in neck and shoulder, but continues with N/T into first two digits of Rt hand.  Tolerates tx with no complaints.      Plan:   Continue per POC and as pt is able to tolerate so as not to exacerbate any pain or sx's.         Timed:         Manual Therapy:    12    mins  95653;     Therapeutic Exercise:         mins  82076;     Neuromuscular Martina:        mins  99191;    Therapeutic Activity:     24     mins  43101;     Gait Training:           mins  01425;     Ultrasound:          mins  15426;    Self Care                            mins  10212      Un-Timed:  Electrical Stimulation:         mins  23858 ( );  Traction          Mins 78667  Moist Heat:   15 min         Timed Treatment:   36   mins   Total Treatment:     51   mins    Hermila Kunz PTA  [place] License #: [id number]    Physical Therapist Assistant

## 2025-05-13 ENCOUNTER — TREATMENT (OUTPATIENT)
Dept: PHYSICAL THERAPY | Facility: CLINIC | Age: 70
End: 2025-05-13
Payer: MEDICARE

## 2025-05-13 DIAGNOSIS — G89.29 CHRONIC RIGHT SHOULDER PAIN: ICD-10-CM

## 2025-05-13 DIAGNOSIS — M25.511 CHRONIC RIGHT SHOULDER PAIN: ICD-10-CM

## 2025-05-13 DIAGNOSIS — M54.6 THORACIC SPINE PAIN: ICD-10-CM

## 2025-05-13 DIAGNOSIS — M54.2 PAIN, NECK: Primary | ICD-10-CM

## 2025-05-13 PROCEDURE — 97140 MANUAL THERAPY 1/> REGIONS: CPT | Performed by: PHYSICAL THERAPIST

## 2025-05-13 PROCEDURE — 97110 THERAPEUTIC EXERCISES: CPT | Performed by: PHYSICAL THERAPIST

## 2025-05-13 PROCEDURE — 97112 NEUROMUSCULAR REEDUCATION: CPT | Performed by: PHYSICAL THERAPIST

## 2025-05-13 NOTE — PROGRESS NOTES
Physical Therapy Daily Note  Office: [address] [facility] [address], [place] [address]  P: [phone]  F: [phone]    Patient: Yuliet Mcdowell   : 1955  Diagnosis/ICD-10 Code:  Pain, neck [M54.2]  Referring practitioner: KEREN Butterfield  Today's Date: 2025  Patient seen for 11 sessions      ICD-10-CM ICD-9-CM   1. Pain, neck  M54.2 723.1   2. Chronic right shoulder pain  M25.511 719.41    G89.29 338.29   3. Thoracic spine pain  M54.6 724.1                                                                                                                                                                                                                                                                                                                                   VISIT#:  sessions (PN due: 2025/Recert due 2025)     Subjective  Yuliet Mcdowell reports she is still having n/t in the R hand and occasionally in the forearm. Pain is still better. She has started to have a little n/t in the L fingers as well. Started yesterday, but not sure why. She did notice her neck was more sore on Saturday after she was here on Friday.     Objective  Tenderness R lower cerv/upper thoracic as well as L this date    See Exercise, Manual, and Modality Logs for complete treatment.     Home Exercises:  [id number]     Assessment/Plan   Pt demonstrates more tenderness B cervical spine this date. Noted improvement in pain with cervical distraction. Pt able to do exercises well with no c/o pain. However, did have difficulty with scap stabilization during mid rows and note B UT compensation. Improved some with cuing and practice. MHP at end of visit to assist with any soreness.       Progress per Plan of Care and Progress strengthening /stabilization /functional activity            Timed:         Manual Therapy:    15     mins  33796;     Therapeutic Exercise:    15     mins  17217;     Neuromuscular Martina:    8    mins   66215;    Therapeutic Activity:          mins  93242;     Gait Training:           mins  58866;     Ultrasound:          mins  17455;    Ionto                                   mins   06716  Self Care                            mins   18020    Un-Timed:  Electrical Stimulation:         mins  48143 ( );  Traction          mins 43358  MHP:                            10   mins      Timed Treatment:   38   mins   Total Treatment:     48   mins       Linda Dunn PT, DPT, OCS     [place] License # [id number]

## 2025-05-16 ENCOUNTER — TREATMENT (OUTPATIENT)
Dept: PHYSICAL THERAPY | Facility: CLINIC | Age: 70
End: 2025-05-16
Payer: MEDICARE

## 2025-05-16 DIAGNOSIS — M54.6 THORACIC SPINE PAIN: ICD-10-CM

## 2025-05-16 DIAGNOSIS — M54.2 PAIN, NECK: Primary | ICD-10-CM

## 2025-05-16 DIAGNOSIS — G89.29 CHRONIC RIGHT SHOULDER PAIN: ICD-10-CM

## 2025-05-16 DIAGNOSIS — M25.511 CHRONIC RIGHT SHOULDER PAIN: ICD-10-CM

## 2025-05-16 PROCEDURE — 97140 MANUAL THERAPY 1/> REGIONS: CPT | Performed by: PHYSICAL THERAPIST

## 2025-05-16 PROCEDURE — 97530 THERAPEUTIC ACTIVITIES: CPT | Performed by: PHYSICAL THERAPIST

## 2025-05-16 NOTE — PROGRESS NOTES
Physical Therapy Daily Treatment Note  [facility] Physical Therapy [place] [address]  [place]. [address]  P: [phone] F: [phone]    Patient: Yuliet Mcdowell   : 1955  Referring practitioner: KEREN Butterfield  Date of Initial Visit: Type: THERAPY  Noted: 3/28/2025  Today's Date: 2025  Patient seen for 12 sessions       Visit Diagnoses:    ICD-10-CM ICD-9-CM   1. Pain, neck  M54.2 723.1   2. Chronic right shoulder pain  M25.511 719.41    G89.29 338.29   3. Thoracic spine pain  M54.6 724.1         Subjective:  Yuliet Mcdowell reports: continued N/T in first two fingers of Rt hand.  No pain.  No n/t in Lt hand this date.       Objective   See Exercise, Manual, and Modality Logs for complete treatment.  Adjusted median nerve glide but pt does not get much of stretch with this.  Continued with what has been established. No reports of pain throughout.       Assessment:  Pt tolerates tx well with no complaints. Still struggeling with N/T in first two digits of Rt hand. Pt currently without pain so has demo'ed progress with this.       Plan:   Continue per POC to reach established goals .         Timed:         Manual Therapy:    12     mins  69986;     Therapeutic Exercise:         mins  37021;     Neuromuscular Martina:        mins  53860;    Therapeutic Activity:     19     mins  95445;     Gait Training:           mins  65561;     Ultrasound:          mins  57921;    Self Care                            mins  70680      Un-Timed:  Electrical Stimulation:         mins  10566 ( );  Traction          mins 59302  Moist Heat:   12  min         Timed Treatment:   31   mins   Total Treatment:     43   mins    Hermila Kunz PTA  [place] License #: [id number]    Physical Therapist Assistant

## 2025-05-23 ENCOUNTER — TREATMENT (OUTPATIENT)
Dept: PHYSICAL THERAPY | Facility: CLINIC | Age: 70
End: 2025-05-23
Payer: MEDICARE

## 2025-05-23 DIAGNOSIS — M25.511 CHRONIC RIGHT SHOULDER PAIN: ICD-10-CM

## 2025-05-23 DIAGNOSIS — G89.29 CHRONIC RIGHT SHOULDER PAIN: ICD-10-CM

## 2025-05-23 DIAGNOSIS — M54.6 THORACIC SPINE PAIN: ICD-10-CM

## 2025-05-23 DIAGNOSIS — M54.2 PAIN, NECK: Primary | ICD-10-CM

## 2025-05-23 PROCEDURE — 97140 MANUAL THERAPY 1/> REGIONS: CPT | Performed by: PHYSICAL THERAPIST

## 2025-05-23 PROCEDURE — 97530 THERAPEUTIC ACTIVITIES: CPT | Performed by: PHYSICAL THERAPIST

## 2025-05-23 NOTE — PROGRESS NOTES
Physical Therapy Daily Treatment Note  [facility] Physical Therapy [place]  [address]  [place]. [address]  P: [phone] F: [phone]    Patient: Yuliet Mcdowell   : 1955  Referring practitioner: KEREN Butterfield  Date of Initial Visit: No linked episodes  Today's Date: 2025  Patient seen for Visit count could not be calculated. Make sure you are using a visit which is associated with an episode. sessions       Visit Diagnoses:  No diagnosis found.      Subjective:  Yuliet Mcdowell reports: no pain just the usual N/T in first two fingers on Rt hand.  Feels she is doing well. Pt stopped taking Gabapentin last week and states she feels fine and does not feel it was doing anything.       Objective   See Exercise, Manual, and Modality Logs for complete treatment.       Assessment:  Pt has made great progress. Currently without pain.  Pt is motivated and happy with progress.  Demo's good tech with all stretches.  Still struggling with n/t in first two digits of rt hand.  Reports feeling good post tx. No complaints       Plan: Cont per POC to progress towards goals.           Timed:         Manual Therapy:    10     mins  51448;     Therapeutic Exercise:         mins  62285;     Neuromuscular Martina:        mins  45635;    Therapeutic Activity:     18     mins  50278;     Gait Training:           mins  07571;     Ultrasound:          mins  59065;    Self Care                            mins  32741      Un-Timed:  Electrical Stimulation:         mins  74442 ( );  Traction          mins 32666        Timed Treatment:   28   mins   Total Treatment:     43   mins    Hermila Kunz PTA  [place] License #: [id number]    Physical Therapist Assistant

## 2025-05-27 ENCOUNTER — TREATMENT (OUTPATIENT)
Dept: PHYSICAL THERAPY | Facility: CLINIC | Age: 70
End: 2025-05-27
Payer: MEDICARE

## 2025-05-27 DIAGNOSIS — M25.511 CHRONIC RIGHT SHOULDER PAIN: ICD-10-CM

## 2025-05-27 DIAGNOSIS — M54.2 PAIN, NECK: Primary | ICD-10-CM

## 2025-05-27 DIAGNOSIS — G89.29 CHRONIC RIGHT SHOULDER PAIN: ICD-10-CM

## 2025-05-27 DIAGNOSIS — M54.6 THORACIC SPINE PAIN: ICD-10-CM

## 2025-05-27 PROCEDURE — 97110 THERAPEUTIC EXERCISES: CPT | Performed by: PHYSICAL THERAPIST

## 2025-05-27 PROCEDURE — 97112 NEUROMUSCULAR REEDUCATION: CPT | Performed by: PHYSICAL THERAPIST

## 2025-05-27 NOTE — PROGRESS NOTES
Re-Assessment/Progress Note  Office: [address] [facility] [address], [place] [address]  P: [phone]   F: [phone]        Patient: Yuliet Mcdowell   : 1955  Diagnosis/ICD-10 Code:  Pain, neck [M54.2]  Referring practitioner: KEREN Butterfield  Date of Initial Visit: Type: THERAPY  Noted: 3/28/2025  Today's Date: 2025  Patient seen for 14 sessions      ICD-10-CM ICD-9-CM   1. Pain, neck  M54.2 723.1   2. Chronic right shoulder pain  M25.511 719.41    G89.29 338.29   3. Thoracic spine pain  M54.6 724.1       Subjective:   Yuliet Mcdowell reports she hasn't really been having any pain at all. Occasionally will have some in the tip of the R index finger, however not all the time. She is still having some numbness in the first two fingers in the R hand. Has stopped taking the gabapentin and pain did not return. Is working normally and lifting boxes as needed without increase in symptoms.     Pain: Current: 0/10, Best: 0/10, Worst: 8/10 (just pain in the tip of index finger of the R hand intermittently)    Subjective Questionnaire: QuickDASH: 18% impairment   Clinical Progress: improved  Home Program Compliance: Yes  Treatment has included: therapeutic exercise, neuromuscular re-education, manual therapy, therapeutic activity, electrical stimulation, and moist heat    Objective          Postural Observations  Seated posture: good  Standing posture: good      Palpation     Right   Hypertonic in the cervical paraspinals and rhomboids. Tenderness of the cervical paraspinals and rhomboids.     Tenderness   Cervical Spine   Tenderness in the facet joint.     Additional Tenderness Details  Tenderness ~T1-2 on the R     Neurological Testing     Sensation   Cervical/Thoracic   Left   Intact: light touch    Right   Diminished: light touch  Paresthesia: light touch    Active Range of Motion   Cervical/Thoracic Spine   Cervical    Flexion: 60 degrees   Extension: 42 (Min pain base of skull) degrees with pain  Left  lateral flexion: 45 degrees   Right lateral flexion: 45 degrees   Left rotation: 76 degrees   Right rotation: 78 degrees   Left Shoulder   Normal active range of motion    Right Shoulder   Normal active range of motion    Strength/Myotome Testing     Additional Strength Details  Did not assess this date       Assessment & Plan       Assessment  Impairments: abnormal coordination, abnormal muscle firing, abnormal muscle tone, abnormal or restricted ROM and impaired physical strength   Functional limitations: lifting, pushing and reaching overhead   Assessment details: Pt is a 69 year old with c/o cervical spine pain with paresthesia and pain in the R UE. Pt demonstrates improvement in cervical spine AROM, however does continue to have some limitations at end ranges. Pt demonstrates increased recruitment of DNF as well as scap stabilizers to support posture and offload cervical spine. Pt is able to lift light to mod weight without increased pain. Pt continues to have paresthesia in 2nd and 3rd fingers, however has min to no pain overall.     Functional limitations are listed above. Pt will benefit from PT in order to address impairments and improve overall function. Plan to have pt follow-up in 2 weeks to determine if ready for discharge.     Prognosis: good    Goals  Plan Goals: STG (3 weeks):  Pt will demonstrate increased activation of scap stabilizers and DNF during activities/exercises to decrease load on cervical spine. - met   Pt will display improved ROM of cervical and thoracic spine to WFL with min to no pain to assist with functional tasks. - progressing   Pt will be independent with home exercises to assist with improved strength and continue to improve function. - met for current program      LTG (6 weeks):  Pt will demonstrate decreased score on the NDI to 10% to demonstrate decreased overall impairment. - not met  Pt will be able to perform work tasks with min to no increased tightness or pain in the  cervicothoracic spine for improved function. - progressing  Pt will demonstrate improved cervical spine and scapular strength to 4+/5 overall to assist with function.  - progressing  Pt will display improved ability to drive and turn head without pain for improved safety on the road. - met  Pt will be able to amb up and down steps without pain to improve overall function. - met    Plan  Therapy options: will be seen for skilled therapy services  Planned modality interventions: cryotherapy, TENS, thermotherapy (hydrocollator packs) and traction  Planned therapy interventions: ADL retraining, body mechanics training, fine motor coordination training, flexibility, functional ROM exercises, home exercise program, joint mobilization, manual therapy, motor coordination training, neuromuscular re-education, postural training, soft tissue mobilization, spinal/joint mobilization, strengthening, stretching and therapeutic activities  Frequency: 2x week  Duration in weeks: 8  Treatment plan discussed with: patient      Progress toward previous goals: Partially Met        Recommendations: Continue as planned  Timeframe: 2 months  Prognosis to achieve goals: good          Timed:         Manual Therapy:         mins  42022;     Therapeutic Exercise:    23     mins  68568;     Neuromuscular Martina:    15    mins  84326;    Therapeutic Activity:          mins  85430;     Gait Training:           mins  75992;     Ultrasound:          mins  36973;    Ionto                                   mins   55836  Self Care                            mins   33265    Un-Timed:  Electrical Stimulation:         mins  14145 ( );  Traction          mins 87076  Re-Eval                               mins  50922      Timed Treatment:   38   mins   Total Treatment:     38   mins      PT Signature: Linda Dunn PT, DPT, OCS     [place] License # [id number]

## 2025-06-05 ENCOUNTER — OFFICE VISIT (OUTPATIENT)
Dept: PAIN MEDICINE | Facility: CLINIC | Age: 70
End: 2025-06-05
Payer: MEDICARE

## 2025-06-05 VITALS
SYSTOLIC BLOOD PRESSURE: 124 MMHG | RESPIRATION RATE: 16 BRPM | HEART RATE: 58 BPM | WEIGHT: 181 LBS | OXYGEN SATURATION: 99 % | DIASTOLIC BLOOD PRESSURE: 75 MMHG | BODY MASS INDEX: 33.11 KG/M2

## 2025-06-05 DIAGNOSIS — M54.12 CERVICAL RADICULOPATHY: Primary | ICD-10-CM

## 2025-06-05 DIAGNOSIS — M47.812 CERVICAL SPONDYLOSIS: ICD-10-CM

## 2025-06-05 NOTE — PROGRESS NOTES
Subjective   Yuliet Mcdowell is a 69 y.o. female is here for follow up for neck pain. Patient was last seen for AMARI C7-T1 with significant improvement in pain where she is off of all medications.  She stopped taking gabapentin about 1 month ago as well.  She has residual left index numbness and mild intermittent pain.    On last visit:     After AMARI  Neck pain is 0/10 on VAS.      Before AMARI  Neck pain is 3/10 on VAS, at maximum is 8/10. Pain is dull, achy, sharp, stabbing in nature. Pain is referred to R shoulder, R triceps, R forearm and hand and intermittently to right arm along with numbness and weakness.. The pain is constat. The pain is improved by rest. The pain is worse with increased activities. +numbness and tingling in R forearm, 2nd and 3rd digit.                   Previous Injection:   5/7/2025-AMARI C7-T1-95% pain relief with functional improvement.    Hx: Referred by  Brittany Junior AP  to our pain management clinic for consultation, evaluation and treatment of Neck pain. Neck pain with radiculopathy started on 3/31/25 when she tried to reach something overhead.  MRI cervical spine has been obtained and reviewed.  She has also tried gabapentin and Veblen along with physical therapy. She has been with PT for twice a day. Gabapentin has helped at night time.  She is here with her  today.       SOAPP-   [place] back disability scale - 43     PMH:   Psoriasis, vitamin B12 deficiency     Current Medications:   Gabapentin 300 mg qhs      Past Medications:  Norco 5-325 mg #30 tabs - didn't help      Past Modalities:  TENS:                                                                          no                                                  Physical Therapy Within The Last 6 Months              Yes- 3/28/2025-current  Psychotherapy                                                            no  Massage Therapy                                                       no     Patient Complains  Of:  Uro-Fecal Incontinence          no  Weight Gain/Loss                   no  Fever/Chills                             no  Weakness                               no        PEG Assessment   What number best describes your pain on average in the past week?4  What number best describes how, during the past week, pain has interfered with your enjoyment of life?4  What number best describes how, during the past week, pain has interfered with your general activity?  5      Current Outpatient Medications:     Cyanocobalamin (VITAMIN B-12 PO), Take  by mouth., Disp: , Rfl:     Docusate Calcium (STOOL SOFTENER PO), Take  by mouth., Disp: , Rfl:     gabapentin (NEURONTIN) 300 MG capsule, Take 1 capsule by mouth Every Night., Disp: 30 capsule, Rfl: 1    HYDROcodone-acetaminophen (NORCO) 5-325 MG per tablet, Take 1 tablet by mouth Every 6 (Six) Hours As Needed for Severe Pain., Disp: 30 tablet, Rfl: 0    levothyroxine (SYNTHROID, LEVOTHROID) 75 MCG tablet, Take 1 tablet by mouth once daily, Disp: 90 tablet, Rfl: 3    Loratadine (CLARITIN PO), Take 1 tablet by mouth Daily., Disp: , Rfl:     LORazepam (Ativan) 0.5 MG tablet, Take 1 dose 1 hour prior to MRI, repeat at time of exam if needed, Disp: 2 tablet, Rfl: 0    multivitamin (THERAGRAN) tablet tablet, Take  by mouth Daily., Disp: , Rfl:     simvastatin (ZOCOR) 20 MG tablet, Take 1 tablet by mouth Every Night., Disp: 90 tablet, Rfl: 3    VITAMIN D PO, Take  by mouth., Disp: , Rfl:     The following portions of the patient's history were reviewed and updated as appropriate: allergies, current medications, past family history, past medical history, past social history, past surgical history, and problem list.      REVIEW OF PERTINENT MEDICAL DATA    Past Medical History:   Diagnosis Date    Allergic 5/11/2010    Arm pain, right     History of medical problems Vitamin D deficiency    10/2/2012    Hyperlipidemia     Hypothyroidism     Neck pain      Past Surgical History:    Procedure Laterality Date     SECTION      COLONOSCOPY  2019    few polyps removed     Family History   Problem Relation Age of Onset    Cancer Mother     Cancer Father     Heart disease Sister     Heart attack Maternal Grandmother      Social History     Socioeconomic History    Marital status:    Tobacco Use    Smoking status: Never     Passive exposure: Never    Smokeless tobacco: Never   Vaping Use    Vaping status: Never Used   Substance and Sexual Activity    Alcohol use: Not Currently    Drug use: Never    Sexual activity: Defer         Review of Systems   Musculoskeletal:  Positive for arthralgias and neck pain.         Vitals:    25 1522   BP: 124/75   Pulse: 58   Resp: 16   SpO2: 99%   Weight: 82.1 kg (181 lb)   PainSc: 0-No pain         Objective   Physical Exam  Musculoskeletal:         General: Tenderness present.        Arms:            Imaging Reviewed:  MRI cervical spine without contrast-2025  C5-C6-minimal disc bulge with mild central stenosis  C6-7-large right paracentral protrusion with severe central stenosis and narrowing of medial lateral recess and right neural foramina.  Moderate narrowing of left neuroforaminal  C7-T1-no spinal canal stenosis.     Cervical x-ray-3/10/2025  - Degenerative findings most pronounced at C5-C6  - Multilevel facet arthritis     Right shoulder x-ray-3/10/2025  - Degenerative changes most notable at the AC joint of right shoulder.  - Mild irregularities at the greater tuberosity suggesting rotator cuff arthropathy  - Mild arthritis in the glenohumeral joint.    Assessment:    1. Cervical radiculopathy    2. Cervical spondylosis         Plan:   1.  Defer UDS for now.  2. We discussed trying a course of formal physical therapy.  Physical therapy can help strengthen and stretch the muscles around the joints. Continue to be as active as possible.  Patient is currently in PT.  3.  Excellent relief with AMARI C7-T1.  Repeat as needed.  4.   Patient has some residual intermittent right index and right forearm numbness.  We discussed possibility of restarting gabapentin and trying it for 1 month.  If gabapentin does help with this pain, she may continue gabapentin for longer period of time.     RTC as needed.     Bradley Cantor DO  Pain Management   [facility]       INSPECT REPORT    As part of the patient's treatment plan, I may be prescribing controlled substances. The patient has been made aware of appropriate use of such medications, including potential risk of somnolence, limited ability to drive and/or work safely, and the potential for dependence or overdose. It has also been made clear that these medications are for use by this patient only, without concomitant use of alcohol or other substances unless prescribed.     Patient has completed prescribing agreement detailing terms of continued prescribing of controlled substances, including monitoring INSPECT reports, urine drug screening, and pill counts if necessary. The patient is aware that inappropriate use will results in cessation of prescribing such medications.    INSPECT report has been reviewed and scanned into the patient's chart.

## 2025-06-10 ENCOUNTER — TREATMENT (OUTPATIENT)
Dept: PHYSICAL THERAPY | Facility: CLINIC | Age: 70
End: 2025-06-10
Payer: MEDICARE

## 2025-06-10 DIAGNOSIS — M54.6 THORACIC SPINE PAIN: ICD-10-CM

## 2025-06-10 DIAGNOSIS — G89.29 CHRONIC RIGHT SHOULDER PAIN: ICD-10-CM

## 2025-06-10 DIAGNOSIS — M54.2 PAIN, NECK: Primary | ICD-10-CM

## 2025-06-10 DIAGNOSIS — M25.511 CHRONIC RIGHT SHOULDER PAIN: ICD-10-CM

## 2025-06-10 PROCEDURE — 97110 THERAPEUTIC EXERCISES: CPT | Performed by: PHYSICAL THERAPIST

## 2025-06-10 NOTE — PROGRESS NOTES
Physical Therapy Daily Note  Office: [address] [facility] [address], [place] [address]  P: [phone]  F: [phone]    Patient: Yuliet Mcdowell   : 1955  Diagnosis/ICD-10 Code:  Pain, neck [M54.2]  Referring practitioner: KEREN Butterfield  Today's Date: 6/10/2025  Patient seen for 15 sessions      ICD-10-CM ICD-9-CM   1. Pain, neck  M54.2 723.1   2. Chronic right shoulder pain  M25.511 719.41    G89.29 338.29   3. Thoracic spine pain  M54.6 724.1                                                                                                                                                                                                                                                                                                                                   VISIT#: 15/24 sessions (PN due: 2025/Recert due 2025)     Subjective  Yuliet Mcdowell reports she is still having some numbness and pain in the fingers but neck pain is still better. She did see doctor again and they told her to continue using gabapentin since she had stopped. Told her that it would help the pain and paresthesia in her hand. Pt hasn't started it back up yet though. She doesn't really want to. Also has only done her exercises a couple of times per week since her last visit.     Pain: best: 0/10, current: 6/10, worst: 8/10     Objective  Cervical spine AROM(deg):   Flex: 62, ext: 48, R lat flex 42, L lat flex 42, R rot 52, L rot 65 with no pain, just stretching at end ranges for all motions     See Exercise, Manual, and Modality Logs for complete treatment.     Home Exercises:  [id number]     Assessment/Plan   Pt demonstrates continued limitation with cervical spine rotation B, however improved with all other motions. Tightness noted in cervical spine, however pt has not been doing home exercises. Pt instructed to do home exercises 2x per day like HEP states so that her symptoms will continue to improve. Pt acknowledged  understanding. Pt has little to no pain in cervical spine. Pain in fingers is intermittent and should improve as pt continues with HEP. Pt has met most goals (see PN from last visit) and will be discharged with HEP at this time.       Discharge with HEP             Timed:         Manual Therapy:         mins  16603;     Therapeutic Exercise:    15     mins  83714;     Neuromuscular Martina:        mins  86996;    Therapeutic Activity:          mins  01139;     Gait Training:           mins  71011;     Ultrasound:          mins  46534;    Ionto                                   mins   19492  Self Care                            mins   58200    Un-Timed:  Electrical Stimulation:         mins  84825 ( );  Traction          mins 93546  MHP:                               mins      Timed Treatment:   15   mins   Total Treatment:     15   mins (pt in clinic for ~30 mins total, however only charged for one-on-one care)         Linda Dunn, PT, DPT, OCS     [place] License # [id number]

## 2025-07-24 ENCOUNTER — TRANSCRIBE ORDERS (OUTPATIENT)
Dept: ADMINISTRATIVE | Facility: HOSPITAL | Age: 70
End: 2025-07-24
Payer: MEDICARE

## 2025-07-24 DIAGNOSIS — Z12.31 SCREENING MAMMOGRAM, ENCOUNTER FOR: Primary | ICD-10-CM

## 2025-07-25 RX ORDER — LEVOTHYROXINE SODIUM 75 UG/1
75 TABLET ORAL DAILY
Qty: 90 TABLET | Refills: 3 | Status: SHIPPED | OUTPATIENT
Start: 2025-07-25

## 2025-08-11 ENCOUNTER — HOSPITAL ENCOUNTER (OUTPATIENT)
Dept: MAMMOGRAPHY | Facility: HOSPITAL | Age: 70
Discharge: HOME | End: 2025-08-11
Admitting: NURSE PRACTITIONER
Payer: MEDICARE

## 2025-08-11 DIAGNOSIS — Z12.31 SCREENING MAMMOGRAM, ENCOUNTER FOR: ICD-10-CM

## 2025-08-11 PROCEDURE — 77063 BREAST TOMOSYNTHESIS BI: CPT

## 2025-08-11 PROCEDURE — 77067 SCR MAMMO BI INCL CAD: CPT

## 2025-08-28 PROCEDURE — 82306 VITAMIN D 25 HYDROXY: CPT | Performed by: NURSE PRACTITIONER

## 2025-08-28 PROCEDURE — 80061 LIPID PANEL: CPT | Performed by: NURSE PRACTITIONER

## 2025-08-28 PROCEDURE — 80053 COMPREHEN METABOLIC PANEL: CPT | Performed by: NURSE PRACTITIONER

## 2025-08-28 PROCEDURE — 85027 COMPLETE CBC AUTOMATED: CPT | Performed by: NURSE PRACTITIONER

## 2025-08-28 PROCEDURE — 84443 ASSAY THYROID STIM HORMONE: CPT | Performed by: NURSE PRACTITIONER
